# Patient Record
Sex: MALE | Race: WHITE | NOT HISPANIC OR LATINO | URBAN - METROPOLITAN AREA
[De-identification: names, ages, dates, MRNs, and addresses within clinical notes are randomized per-mention and may not be internally consistent; named-entity substitution may affect disease eponyms.]

---

## 2022-10-06 ENCOUNTER — EMERGENCY (EMERGENCY)
Facility: HOSPITAL | Age: 47
LOS: 0 days | Discharge: HOME | End: 2022-10-06
Attending: EMERGENCY MEDICINE | Admitting: EMERGENCY MEDICINE

## 2022-10-06 VITALS
HEART RATE: 67 BPM | SYSTOLIC BLOOD PRESSURE: 119 MMHG | OXYGEN SATURATION: 99 % | RESPIRATION RATE: 14 BRPM | DIASTOLIC BLOOD PRESSURE: 79 MMHG

## 2022-10-06 VITALS
HEART RATE: 78 BPM | WEIGHT: 199.96 LBS | SYSTOLIC BLOOD PRESSURE: 128 MMHG | OXYGEN SATURATION: 99 % | RESPIRATION RATE: 16 BRPM | DIASTOLIC BLOOD PRESSURE: 85 MMHG | TEMPERATURE: 97 F

## 2022-10-06 DIAGNOSIS — R11.2 NAUSEA WITH VOMITING, UNSPECIFIED: ICD-10-CM

## 2022-10-06 DIAGNOSIS — Z84.1 FAMILY HISTORY OF DISORDERS OF KIDNEY AND URETER: ICD-10-CM

## 2022-10-06 DIAGNOSIS — E78.5 HYPERLIPIDEMIA, UNSPECIFIED: ICD-10-CM

## 2022-10-06 DIAGNOSIS — R10.9 UNSPECIFIED ABDOMINAL PAIN: ICD-10-CM

## 2022-10-06 DIAGNOSIS — N20.0 CALCULUS OF KIDNEY: ICD-10-CM

## 2022-10-06 DIAGNOSIS — Z90.49 ACQUIRED ABSENCE OF OTHER SPECIFIED PARTS OF DIGESTIVE TRACT: ICD-10-CM

## 2022-10-06 LAB
ALBUMIN SERPL ELPH-MCNC: 5.2 G/DL — SIGNIFICANT CHANGE UP (ref 3.5–5.2)
ALP SERPL-CCNC: 68 U/L — SIGNIFICANT CHANGE UP (ref 30–115)
ALT FLD-CCNC: 70 U/L — HIGH (ref 0–41)
ANION GAP SERPL CALC-SCNC: 13 MMOL/L — SIGNIFICANT CHANGE UP (ref 7–14)
APPEARANCE UR: ABNORMAL
AST SERPL-CCNC: 38 U/L — SIGNIFICANT CHANGE UP (ref 0–41)
BACTERIA # UR AUTO: ABNORMAL
BASOPHILS # BLD AUTO: 0.02 K/UL — SIGNIFICANT CHANGE UP (ref 0–0.2)
BASOPHILS NFR BLD AUTO: 0.3 % — SIGNIFICANT CHANGE UP (ref 0–1)
BILIRUB DIRECT SERPL-MCNC: 0.2 MG/DL — SIGNIFICANT CHANGE UP (ref 0–0.3)
BILIRUB INDIRECT FLD-MCNC: 0.7 MG/DL — SIGNIFICANT CHANGE UP (ref 0.2–1.2)
BILIRUB SERPL-MCNC: 0.9 MG/DL — SIGNIFICANT CHANGE UP (ref 0.2–1.2)
BILIRUB UR-MCNC: NEGATIVE — SIGNIFICANT CHANGE UP
BUN SERPL-MCNC: 11 MG/DL — SIGNIFICANT CHANGE UP (ref 10–20)
CALCIUM SERPL-MCNC: 10.1 MG/DL — SIGNIFICANT CHANGE UP (ref 8.4–10.5)
CHLORIDE SERPL-SCNC: 99 MMOL/L — SIGNIFICANT CHANGE UP (ref 98–110)
CO2 SERPL-SCNC: 27 MMOL/L — SIGNIFICANT CHANGE UP (ref 17–32)
COLOR SPEC: YELLOW — SIGNIFICANT CHANGE UP
CREAT SERPL-MCNC: 1 MG/DL — SIGNIFICANT CHANGE UP (ref 0.7–1.5)
DIFF PNL FLD: ABNORMAL
EGFR: 95 ML/MIN/1.73M2 — SIGNIFICANT CHANGE UP
EOSINOPHIL # BLD AUTO: 0.12 K/UL — SIGNIFICANT CHANGE UP (ref 0–0.7)
EOSINOPHIL NFR BLD AUTO: 1.6 % — SIGNIFICANT CHANGE UP (ref 0–8)
EPI CELLS # UR: 0 /HPF — SIGNIFICANT CHANGE UP (ref 0–5)
GLUCOSE SERPL-MCNC: 97 MG/DL — SIGNIFICANT CHANGE UP (ref 70–99)
GLUCOSE UR QL: SIGNIFICANT CHANGE UP
HCT VFR BLD CALC: 47.2 % — SIGNIFICANT CHANGE UP (ref 42–52)
HGB BLD-MCNC: 16.7 G/DL — SIGNIFICANT CHANGE UP (ref 14–18)
HYALINE CASTS # UR AUTO: 7 /LPF — SIGNIFICANT CHANGE UP (ref 0–7)
IMM GRANULOCYTES NFR BLD AUTO: 0.4 % — HIGH (ref 0.1–0.3)
KETONES UR-MCNC: SIGNIFICANT CHANGE UP
LEUKOCYTE ESTERASE UR-ACNC: NEGATIVE — SIGNIFICANT CHANGE UP
LIDOCAIN IGE QN: 32 U/L — SIGNIFICANT CHANGE UP (ref 7–60)
LYMPHOCYTES # BLD AUTO: 2.99 K/UL — SIGNIFICANT CHANGE UP (ref 1.2–3.4)
LYMPHOCYTES # BLD AUTO: 39.7 % — SIGNIFICANT CHANGE UP (ref 20.5–51.1)
MCHC RBC-ENTMCNC: 34 PG — HIGH (ref 27–31)
MCHC RBC-ENTMCNC: 35.4 G/DL — SIGNIFICANT CHANGE UP (ref 32–37)
MCV RBC AUTO: 96.1 FL — HIGH (ref 80–94)
MONOCYTES # BLD AUTO: 0.5 K/UL — SIGNIFICANT CHANGE UP (ref 0.1–0.6)
MONOCYTES NFR BLD AUTO: 6.6 % — SIGNIFICANT CHANGE UP (ref 1.7–9.3)
NEUTROPHILS # BLD AUTO: 3.87 K/UL — SIGNIFICANT CHANGE UP (ref 1.4–6.5)
NEUTROPHILS NFR BLD AUTO: 51.4 % — SIGNIFICANT CHANGE UP (ref 42.2–75.2)
NITRITE UR-MCNC: NEGATIVE — SIGNIFICANT CHANGE UP
NRBC # BLD: 0 /100 WBCS — SIGNIFICANT CHANGE UP (ref 0–0)
PH UR: 5.5 — SIGNIFICANT CHANGE UP (ref 5–8)
PLATELET # BLD AUTO: 262 K/UL — SIGNIFICANT CHANGE UP (ref 130–400)
POTASSIUM SERPL-MCNC: 3.9 MMOL/L — SIGNIFICANT CHANGE UP (ref 3.5–5)
POTASSIUM SERPL-SCNC: 3.9 MMOL/L — SIGNIFICANT CHANGE UP (ref 3.5–5)
PROT SERPL-MCNC: 7.6 G/DL — SIGNIFICANT CHANGE UP (ref 6–8)
PROT UR-MCNC: ABNORMAL
RBC # BLD: 4.91 M/UL — SIGNIFICANT CHANGE UP (ref 4.7–6.1)
RBC # FLD: 11.7 % — SIGNIFICANT CHANGE UP (ref 11.5–14.5)
RBC CASTS # UR COMP ASSIST: 15 /HPF — HIGH (ref 0–4)
SODIUM SERPL-SCNC: 139 MMOL/L — SIGNIFICANT CHANGE UP (ref 135–146)
SP GR SPEC: 1.03 — HIGH (ref 1.01–1.03)
UROBILINOGEN FLD QL: ABNORMAL
WBC # BLD: 7.53 K/UL — SIGNIFICANT CHANGE UP (ref 4.8–10.8)
WBC # FLD AUTO: 7.53 K/UL — SIGNIFICANT CHANGE UP (ref 4.8–10.8)
WBC UR QL: 3 /HPF — SIGNIFICANT CHANGE UP (ref 0–5)

## 2022-10-06 PROCEDURE — 99285 EMERGENCY DEPT VISIT HI MDM: CPT

## 2022-10-06 PROCEDURE — 74177 CT ABD & PELVIS W/CONTRAST: CPT | Mod: 26,MA

## 2022-10-06 RX ORDER — SODIUM CHLORIDE 9 MG/ML
1000 INJECTION, SOLUTION INTRAVENOUS ONCE
Refills: 0 | Status: COMPLETED | OUTPATIENT
Start: 2022-10-06 | End: 2022-10-06

## 2022-10-06 RX ORDER — TAMSULOSIN HYDROCHLORIDE 0.4 MG/1
1 CAPSULE ORAL
Qty: 7 | Refills: 0
Start: 2022-10-06 | End: 2022-10-12

## 2022-10-06 RX ORDER — ONDANSETRON 8 MG/1
4 TABLET, FILM COATED ORAL ONCE
Refills: 0 | Status: DISCONTINUED | OUTPATIENT
Start: 2022-10-06 | End: 2022-10-06

## 2022-10-06 RX ADMIN — SODIUM CHLORIDE 1000 MILLILITER(S): 9 INJECTION, SOLUTION INTRAVENOUS at 12:55

## 2022-10-06 NOTE — ED PROVIDER NOTE - NSFOLLOWUPINSTRUCTIONS_ED_ALL_ED_FT
YOU WERE FOUND TO HAVE A RIGHT SIDED KIDNEY STONE. A COPY OF YOUR RESULTS IS ATTACHED.  FOLLOW UP WITH YOUR PRIMARY CARE DOCTOR AND A UROLOGIST.   TAKE IBUPROFEN AND TYLENOL AS NEEDED FOR PAIN.  TAKE FLOMAX ONCE A DAY (NIGHTLY) TO HELP YOU PASS THE STONE.     Kidney Stones    Kidney stones (urolithiasis) are deposits that form inside your kidneys. The intense pain is caused by the stone moving through the urinary tract. When the stone moves, the ureter goes into spasm around the stone. The stone is usually passed in the urine. Symptoms include abdominal, side, or back pain, nausea, vomiting, blood in the urine, frequency with urination. Drink enough water and fluids to keep your urine clear or pale yellow. This will help you to pass the stone or stone fragments.    SEEK IMMEDIATE MEDICAL CARE IF YOU HAVE THE FOLLOWING SYMPTOMS: pain not controlled with medication, fever/chills, worsening vomiting, inability to urinate, or dizziness/lightheadedness.

## 2022-10-06 NOTE — ED PROVIDER NOTE - PROGRESS NOTE DETAILS
Doug: CT results reviewed, pt updated with results, will dc  Patient to be discharged from ED. Any available test results were discussed with patient and/or family. Verbal instructions given, including instructions to return to ED immediately for any new, worsening, or concerning symptoms. Patient endorsed understanding. Written discharge instructions additionally given, including follow-up plan.

## 2022-10-06 NOTE — ED PROVIDER NOTE - CLINICAL SUMMARY MEDICAL DECISION MAKING FREE TEXT BOX
Labs okay.  Normal renal function.  No UTI.  CT with 3 x 3 x 4 mm mid ureteral stone on the right associated with hydronephrosis.  Pain is controlled in the ED.  No infectious symptoms.  All results were discussed with patient, he is comfortable discharge on Flomax and prompt urology follow-up.  Return precautions discussed.

## 2022-10-06 NOTE — ED PROVIDER NOTE - OBJECTIVE STATEMENT
46M PMHX HLD, s/p appendectomy presents for R flank pain x 2d, located primarily in R flank, radiates to RLQ, associated with nausea and nonbloody vomiting. Reports dark urine a few days ago. Denies gross hematuria/dysuria/other urinary sxs. No fever/chills, No cp/sob, diarrhea, constipation. Denies hx of kidney stones but endorses family hx of kidney stones.

## 2022-10-06 NOTE — ED PROVIDER NOTE - PHYSICAL EXAMINATION
VITAL SIGNS: I have reviewed nursing notes and confirm.  CONSTITUTIONAL: well-appearing male, NAD, ambulated to exam room.  SKIN: Warm dry, normal skin turgor  HEAD: NCAT  EYES: EOMI, no scleral icterus  ENT: Moist mucous membranes, normal pharynx with no erythema or exudates  NECK: Supple; non tender. Full ROM. No cervical LAD  CARD: RRR, no murmurs, rubs or gallops  RESP: clear to ausculation b/l.  No rales, rhonchi, or wheezing.  ABD: soft, non-tender, non-distended, no rebound or guarding. + R CVA tenderness  EXT: Full ROM, no bony tenderness, no pedal edema, no calf tenderness  NEURO: Normal gait.  PSYCH: Cooperative, appropriate.

## 2022-10-06 NOTE — ED ADULT NURSE NOTE - OBJECTIVE STATEMENT
Pt reports right sided flank pain that radiates to the right side of the abd with nausea. Denies fever, diarrhea, chest pain, or sob. Labs drawn, IV established, urine collected, medication administered.

## 2022-10-06 NOTE — ED PROVIDER NOTE - PATIENT PORTAL LINK FT
You can access the FollowMyHealth Patient Portal offered by [facility] by registering at the following website: [email or web address]. By joining [employer]’s FollowMyHealth portal, you will also be able to view your health information using other applications (apps) compatible with our system.

## 2022-10-06 NOTE — ED PROVIDER NOTE - CARE PROVIDER_API CALL
Rocco Christina)  Urology  [address] [place] [address], [address]  [place] [address]  Phone: [phone]  Fax: [phone]  Follow Up Time: 1-3 Days

## 2022-10-06 NOTE — ED PROVIDER NOTE - ATTENDING CONTRIBUTION TO CARE
46-year-old man, no significant past medical history complains of right flank pain intermittent over the last couple of days, associated with nausea and vomiting.  He did have a couple of episodes of dark urine, no dysuria, fever, chills.  On exam he is afebrile, uncomfortable but nontoxic-appearing.  Right CVA is tender, no rash.  No radha abdominal tenderness.  Symptoms suggestive of renal colic.  Will check labs, urine, imaging, reassess.

## 2024-04-09 ENCOUNTER — EMERGENCY (EMERGENCY)
Facility: HOSPITAL | Age: 49
LOS: 0 days | Discharge: HOME | End: 2024-04-09
Attending: EMERGENCY MEDICINE
Payer: COMMERCIAL

## 2024-04-09 VITALS
OXYGEN SATURATION: 98 % | HEART RATE: 81 BPM | HEIGHT: 72 IN | SYSTOLIC BLOOD PRESSURE: 130 MMHG | TEMPERATURE: 98 F | WEIGHT: 178.57 LBS | DIASTOLIC BLOOD PRESSURE: 93 MMHG | RESPIRATION RATE: 18 BRPM

## 2024-04-09 DIAGNOSIS — R19.7 DIARRHEA, UNSPECIFIED: ICD-10-CM

## 2024-04-09 DIAGNOSIS — K92.1 MELENA: ICD-10-CM

## 2024-04-09 DIAGNOSIS — R10.13 EPIGASTRIC PAIN: ICD-10-CM

## 2024-04-09 DIAGNOSIS — Z96.641 PRESENCE OF RIGHT ARTIFICIAL HIP JOINT: ICD-10-CM

## 2024-04-09 DIAGNOSIS — Z87.19 PERSONAL HISTORY OF OTHER DISEASES OF THE DIGESTIVE SYSTEM: ICD-10-CM

## 2024-04-09 DIAGNOSIS — R11.2 NAUSEA WITH VOMITING, UNSPECIFIED: ICD-10-CM

## 2024-04-09 DIAGNOSIS — Z90.49 ACQUIRED ABSENCE OF OTHER SPECIFIED PARTS OF DIGESTIVE TRACT: ICD-10-CM

## 2024-04-09 PROBLEM — Z78.9 OTHER SPECIFIED HEALTH STATUS: Chronic | Status: ACTIVE | Noted: 2022-10-06

## 2024-04-09 LAB
ALBUMIN SERPL ELPH-MCNC: 4.9 G/DL — SIGNIFICANT CHANGE UP (ref 3.5–5.2)
ALP SERPL-CCNC: 68 U/L — SIGNIFICANT CHANGE UP (ref 30–115)
ALT FLD-CCNC: 54 U/L — HIGH (ref 0–41)
ANION GAP SERPL CALC-SCNC: 15 MMOL/L — HIGH (ref 7–14)
APTT BLD: 32.4 SEC — SIGNIFICANT CHANGE UP (ref 27–39.2)
AST SERPL-CCNC: 36 U/L — SIGNIFICANT CHANGE UP (ref 0–41)
BASOPHILS # BLD AUTO: 0 K/UL — SIGNIFICANT CHANGE UP (ref 0–0.2)
BASOPHILS NFR BLD AUTO: 0 % — SIGNIFICANT CHANGE UP (ref 0–1)
BILIRUB SERPL-MCNC: 0.7 MG/DL — SIGNIFICANT CHANGE UP (ref 0.2–1.2)
BUN SERPL-MCNC: 9 MG/DL — LOW (ref 10–20)
CALCIUM SERPL-MCNC: 10 MG/DL — SIGNIFICANT CHANGE UP (ref 8.4–10.5)
CHLORIDE SERPL-SCNC: 105 MMOL/L — SIGNIFICANT CHANGE UP (ref 98–110)
CO2 SERPL-SCNC: 25 MMOL/L — SIGNIFICANT CHANGE UP (ref 17–32)
CREAT SERPL-MCNC: 0.9 MG/DL — SIGNIFICANT CHANGE UP (ref 0.7–1.5)
EGFR: 106 ML/MIN/1.73M2 — SIGNIFICANT CHANGE UP
EOSINOPHIL # BLD AUTO: 0.04 K/UL — SIGNIFICANT CHANGE UP (ref 0–0.7)
EOSINOPHIL NFR BLD AUTO: 0.9 % — SIGNIFICANT CHANGE UP (ref 0–8)
GLUCOSE SERPL-MCNC: 94 MG/DL — SIGNIFICANT CHANGE UP (ref 70–99)
HCT VFR BLD CALC: 44 % — SIGNIFICANT CHANGE UP (ref 42–52)
HGB BLD-MCNC: 15.6 G/DL — SIGNIFICANT CHANGE UP (ref 14–18)
INR BLD: 1.14 RATIO — SIGNIFICANT CHANGE UP (ref 0.65–1.3)
LYMPHOCYTES # BLD AUTO: 1.73 K/UL — SIGNIFICANT CHANGE UP (ref 1.2–3.4)
LYMPHOCYTES # BLD AUTO: 37.4 % — SIGNIFICANT CHANGE UP (ref 20.5–51.1)
MCHC RBC-ENTMCNC: 32.9 PG — HIGH (ref 27–31)
MCHC RBC-ENTMCNC: 35.5 G/DL — SIGNIFICANT CHANGE UP (ref 32–37)
MCV RBC AUTO: 92.8 FL — SIGNIFICANT CHANGE UP (ref 80–94)
MONOCYTES # BLD AUTO: 0.4 K/UL — SIGNIFICANT CHANGE UP (ref 0.1–0.6)
MONOCYTES NFR BLD AUTO: 8.7 % — SIGNIFICANT CHANGE UP (ref 1.7–9.3)
NEUTROPHILS # BLD AUTO: 1.77 K/UL — SIGNIFICANT CHANGE UP (ref 1.4–6.5)
NEUTROPHILS NFR BLD AUTO: 38.2 % — LOW (ref 42.2–75.2)
PLATELET # BLD AUTO: 208 K/UL — SIGNIFICANT CHANGE UP (ref 130–400)
PMV BLD: 9.5 FL — SIGNIFICANT CHANGE UP (ref 7.4–10.4)
POTASSIUM SERPL-MCNC: 4.5 MMOL/L — SIGNIFICANT CHANGE UP (ref 3.5–5)
POTASSIUM SERPL-SCNC: 4.5 MMOL/L — SIGNIFICANT CHANGE UP (ref 3.5–5)
PROT SERPL-MCNC: 7.6 G/DL — SIGNIFICANT CHANGE UP (ref 6–8)
PROTHROM AB SERPL-ACNC: 13 SEC — HIGH (ref 9.95–12.87)
RBC # BLD: 4.74 M/UL — SIGNIFICANT CHANGE UP (ref 4.7–6.1)
RBC # FLD: 11.5 % — SIGNIFICANT CHANGE UP (ref 11.5–14.5)
SODIUM SERPL-SCNC: 145 MMOL/L — SIGNIFICANT CHANGE UP (ref 135–146)
WBC # BLD: 4.63 K/UL — LOW (ref 4.8–10.8)
WBC # FLD AUTO: 4.63 K/UL — LOW (ref 4.8–10.8)

## 2024-04-09 PROCEDURE — 99285 EMERGENCY DEPT VISIT HI MDM: CPT

## 2024-04-09 PROCEDURE — 85610 PROTHROMBIN TIME: CPT

## 2024-04-09 PROCEDURE — 85025 COMPLETE CBC W/AUTO DIFF WBC: CPT

## 2024-04-09 PROCEDURE — 86850 RBC ANTIBODY SCREEN: CPT

## 2024-04-09 PROCEDURE — 86901 BLOOD TYPING SEROLOGIC RH(D): CPT

## 2024-04-09 PROCEDURE — 74177 CT ABD & PELVIS W/CONTRAST: CPT | Mod: MC

## 2024-04-09 PROCEDURE — 96374 THER/PROPH/DIAG INJ IV PUSH: CPT | Mod: XU

## 2024-04-09 PROCEDURE — 80053 COMPREHEN METABOLIC PANEL: CPT

## 2024-04-09 PROCEDURE — 36415 COLL VENOUS BLD VENIPUNCTURE: CPT

## 2024-04-09 PROCEDURE — 86900 BLOOD TYPING SEROLOGIC ABO: CPT

## 2024-04-09 PROCEDURE — 85730 THROMBOPLASTIN TIME PARTIAL: CPT

## 2024-04-09 PROCEDURE — C9113: CPT

## 2024-04-09 PROCEDURE — 74177 CT ABD & PELVIS W/CONTRAST: CPT | Mod: 26,MC

## 2024-04-09 PROCEDURE — 96375 TX/PRO/DX INJ NEW DRUG ADDON: CPT

## 2024-04-09 PROCEDURE — 99284 EMERGENCY DEPT VISIT MOD MDM: CPT | Mod: 25

## 2024-04-09 RX ORDER — SODIUM CHLORIDE 9 MG/ML
1000 INJECTION INTRAMUSCULAR; INTRAVENOUS; SUBCUTANEOUS ONCE
Refills: 0 | Status: COMPLETED | OUTPATIENT
Start: 2024-04-09 | End: 2024-04-09

## 2024-04-09 RX ORDER — PANTOPRAZOLE SODIUM 20 MG/1
40 TABLET, DELAYED RELEASE ORAL ONCE
Refills: 0 | Status: COMPLETED | OUTPATIENT
Start: 2024-04-09 | End: 2024-04-09

## 2024-04-09 RX ORDER — FAMOTIDINE 10 MG/ML
20 INJECTION INTRAVENOUS ONCE
Refills: 0 | Status: DISCONTINUED | OUTPATIENT
Start: 2024-04-09 | End: 2024-04-09

## 2024-04-09 RX ORDER — ONDANSETRON 8 MG/1
4 TABLET, FILM COATED ORAL ONCE
Refills: 0 | Status: COMPLETED | OUTPATIENT
Start: 2024-04-09 | End: 2024-04-09

## 2024-04-09 RX ADMIN — SODIUM CHLORIDE 1000 MILLILITER(S): 9 INJECTION INTRAMUSCULAR; INTRAVENOUS; SUBCUTANEOUS at 13:04

## 2024-04-09 RX ADMIN — PANTOPRAZOLE SODIUM 40 MILLIGRAM(S): 20 TABLET, DELAYED RELEASE ORAL at 13:04

## 2024-04-09 RX ADMIN — ONDANSETRON 4 MILLIGRAM(S): 8 TABLET, FILM COATED ORAL at 13:03

## 2024-04-09 NOTE — ED ADULT NURSE NOTE - OBJECTIVE STATEMENT
Pt with C/O N/V/ blood in he stool for 5 days , pt denies fever chills ,denies chest pain denies SOB at thi time .

## 2024-04-09 NOTE — ED PROVIDER NOTE - CLINICAL SUMMARY MEDICAL DECISION MAKING FREE TEXT BOX
48-year-old male past medical history of gastritis presents to the emergency department for nausea vomiting and bloody stool that started today.  Patient had URI symptoms for the past week and was started on flu medication.  Patient has history of hemorrhoids and sore blood in the toilet when he went to the bathroom.  Last colonoscopy was 6 months ago and patient reports was normal.  Patient no fever or signs of infectious etiology.  Patient is otherwise eating and drinking normally and denies any chest pain or shortness of breath.    On exam, vital signs reviewed.  Patient well-appearing.  Patient has normal heart and lung exam.  Abdomen is very soft with no epigastric tenderness.  No guarding or rebound.  No CVA tenderness.  Rectal exam shows small amount of blood with no active noticeable hemorrhage.  IV was placed and labs sent, CT scan of abdomen pelvis performed.  Patient has normal hemoglobin and CT scan is unremarkable.  Patient was spoken to in detail about results and plan of care.  Will discharge with close GI follow-up and patient to monitor symptoms and for blood loss.  Strict return precautions discussed and patient aware he may require immediate return to the ED for any significant increase in bleeding, dizziness, weakness, abdominal pain.  Patient aware he may require repeat colonoscopy/endoscopy.  All questions and concerns addressed and copy of results given to patient.

## 2024-04-09 NOTE — ED PROVIDER NOTE - PATIENT PORTAL LINK FT
You can access the FollowMyHealth Patient Portal offered by [facility] by registering at the following website: [email or web address]. By joining [employer]’s FollowMyHealth portal, you will also be able to view your health information using other applications (apps) compatible with our system.

## 2024-04-09 NOTE — ED ADULT TRIAGE NOTE - NS ED NURSE DIRECT TO ROOM YN
HEMODIAYLYSIS    01/21/23  Patient requires Cellentia Dialyzer due to \"passing out\" with Revaclear dialyzer.         No

## 2024-04-09 NOTE — ED ADULT NURSE NOTE - NSFALLUNIVINTERV_ED_ALL_ED
Warm pack applied to back   Bed/Stretcher in lowest position, wheels locked, appropriate side rails in place/Call bell, personal items and telephone in reach/Instruct patient to call for assistance before getting out of bed/chair/stretcher/Non-slip footwear applied when patient is off stretcher/[place] to call system/Physically safe environment - no spills, clutter or unnecessary equipment/Purposeful proactive rounding/Room/bathroom lighting operational, light cord in reach

## 2024-04-09 NOTE — ED PROVIDER NOTE - CONSIDERATION OF ADMISSION OBSERVATION
I considered admission for this patient. They improved in the Emergency Department and shared decision making utilized. Will attempt outpatient management. Patient is a good candidate to attempt outpatient management. Supportive care and home care discussed in detail. Patient aware they may have to return for re-evaluation and possible admission if outpatient treatment fails. Strict return precautions discussed. Consideration of Admission/Observation

## 2024-04-09 NOTE — ED PROVIDER NOTE - OBJECTIVE STATEMENT
49 y/o M, pmhx of gastritis, internal hemorrhoids, s/p appendectomy and right hip replacement surgery a few years ago, comes in for nausea and NBNB Vomiting x4 days and bloody stool that started today. Report URI symptoms for 1 week, has been taking medications after going to urgent care. Had episodes of n/v and diarrhea. Also endorses epigastric burning sensation, has not taken omeprazole for a few weeks. Today patient reports blood with stool in toilet bowl, has happened before with his hemorrhoids. Last colonoscopy 6 months ago with Dr. Clements [facility], was normal. Denies fevers, chest pain, sob, abd pain, dysuria, hematuria.

## 2024-04-09 NOTE — ED PROVIDER NOTE - NSFOLLOWUPINSTRUCTIONS_ED_ALL_ED_FT
Please follow up with your primary care physician and gastroenterologist. Return to the emergency department if your symptoms do not resolve and/or worsen. If you've been prescribed medications, please take your medications as prescribed.  ------------------------------------------------------------------------------------------------------------------------  Nausea / Vomiting    Nausea is the feeling that you have to vomit. As nausea gets worse, it can lead to vomiting. Vomiting puts you at an increased risk for dehydration. Older adults and people with other diseases or a weak immune system are at higher risk for dehydration. Drink clear fluids in small but frequent amounts as tolerated. Eat bland, easy-to-digest foods in small amounts as tolerated.    SEEK IMMEDIATE MEDICAL CARE IF YOU HAVE ANY OF THE FOLLOWING SYMPTOMS: fever, inability to keep sufficient fluids down, black or bloody vomitus, black or bloody stools, lightheadedness/dizziness, chest pain, severe headache, rash, shortness of breath, cold or clammy skin, confusion, pain with urination, or any signs of dehydration.  ------------------------------------------------------------------------------------------------------------------------  Rectal Bleeding      Rectal bleeding is when blood comes out of the opening of the butt (anus). People with this kind of bleeding may notice bright red blood in their underwear or in the toilet after they poop (have a bowel movement). They may also have dark red or black poop (stool). Rectal bleeding is often a sign that something is wrong. It needs to be checked by a doctor.      Follow these instructions at home:    Watch for any changes in your condition. Take these actions to help with bleeding and discomfort:  Eat a diet that is high in fiber. This will keep your poop soft so it is easier for you to poop without pushing too hard. Ask your doctor to tell you what foods and drinks are high in fiber.  Drink enough fluid to keep your pee (urine) clear or pale yellow. This also helps keep your poop soft.  Try taking a warm bath. This may help with pain.  Keep all follow-up visits as told by your doctor. This is important.      Get help right away if:    You have new bleeding.  You have more bleeding than before.  You have black or dark red poop.  You throw up (vomit) blood or something that looks like coffee grounds.  You have pain or tenderness in your belly (abdomen).  You have a fever.  You feel weak.  You feel sick to your stomach (nauseous).  You pass out (faint).  You have very bad pain in your butt.  You cannot poop.  This information is not intended to replace advice given to you by your health care provider. Make sure you discuss any questions you have with your health care provider.

## 2024-04-09 NOTE — ED PROVIDER NOTE - PHYSICAL EXAMINATION
GENERAL: Well-developed; well-nourished; in no acute distress.  SKIN: warm, well perfused  HEAD: Normocephalic; atraumatic.  EYES: no conjunctival erythema, ocular motions intact and appropriate  ENT: No nasal discharge; airway clear.  CARD: Regular rate and rhythm.   RESP: LCTAB; No wheezes or crackles  ABD: soft, nontender, and nondistended  Upper EXT: Normal ROM. Rad pulses 2+ symm, cap refill <2 secs, sensation intact and symm,  strength 5/5  Lower EXT: strength 5/5, ambulates well independently  GI: rectal exam performed with PCA in room, BRBPR observed. no palpable masses internally. no anal fissures visualized

## 2024-04-09 NOTE — ED PROVIDER NOTE - DIFFERENTIAL DIAGNOSIS
Differential Diagnosis The differential diagnosis for patients clinical presentation includes but is not limited to:  UGIB, LGIB, hemorrhoids, polyps, diverticular disease, colitis, pancreatitis, UTI, infection, Diverticulitis, SBO, biliary colic, cholecystitis, pyelonephritis, aortic dissection

## 2024-04-09 NOTE — ED PROVIDER NOTE - ATTENDING CONTRIBUTION TO CARE
ED PROVIDER NOTE    Triage and nursing notes were reviewed and considered, including chief complaint, presenting vital signs, past medical history, medications history, allergies and social hx.  There may be discrepancies between the nursing note and the history/information that I obtained from the patient/family.    JIMMIE Vargas is a 23 year old male who presents with evaluation with concerns for low back pain and abdominal pain.  The patient states his symptoms began this evening.  He describes the pain as cramping in nature.  He denies fever or chills.  He endorses nausea and vomiting.  The patient denies radiation of his pain.  He states that nothing makes the pain better or worse.  He denies fever or chills.  He denies bright red blood per rectum or black tarry stools.  He denies diarrhea.      PAST MEDICAL HISTORY  Past Medical History:   Diagnosis Date   • Ankylosing spondylitis (CMS/HCC)        FAMILY HISTORY  No family history on file.    SOCIAL HISTORY  Social History     Tobacco Use   • Smoking status: Passive Smoke Exposure - Never Smoker   • Smokeless tobacco: Never Used   Substance Use Topics   • Alcohol use: No   • Drug use: No       SURGICAL HISTORY  No past surgical history on file.    CURRENT MEDICATIONS  Discharge Medication List as of 6/14/2020  2:09 AM      CONTINUE these medications which have NOT CHANGED    Details   lamotrigine (LAMICTAL) 200 MG tablet Take 200 mg by mouth daily.Historical Med      Adalimumab (HUMIRA SC) Historical Med      dicyclomine (BENTYL) 20 MG tablet Take 1 tablet by mouth 4 times daily (before meals and nightly).Normal, Disp-20 tablet, R-0      famotidine (PEPCID) 20 MG tablet Take 1 tablet by mouth 2 times daily.Normal, Disp-20 tablet, R-0      sucralfate (CARAFATE) 1 g tablet Take 1 tablet by mouth 4 times daily.Normal, Disp-20 tablet, R-0      FLUoxetine (PROZAC) 20 MG capsule Take 1 capsule by mouth daily.Eprescribe, Disp-30 capsule, R-3      predniSONE  (DELTASONE) 20 MG tablet Prednisone 20 mg tablets one tablet by mouth 3 times a day for 5 daysEprescribe, Disp-15 tablet, R-0             ALLERGIES:  No Known Allergies    REVIEW OF SYSTEMS  Full 10 point review of systems performed and are otherwise negative unless listed in HPI.    PHYSICAL EXAM  Vitals:    06/14/20 0056 06/14/20 0130 06/14/20 0145 06/14/20 0200   BP: 132/70 128/73 122/69 120/69   Pulse: 72 63 64 65   Resp: 18   16   Temp: 98 °F (36.7 °C)      SpO2: 99% 97% 98% 99%   Weight: 80.7 kg      Height: 5' 8\" (1.727 m)        GENERAL: Patient is alert, non-toxic and well-appearing, well-developed, well-nourished, in no acute distress.    HEENT:  Normocephalic, atraumatic.    NECK:  Supple, no lymphadenopathy.  No restricted ROM.  CARDIOVASCULAR:  Regular rate and rhythm with no murmurs, rubs or gallops. There are 2+ radial and dorsalis pedis pulses present bilaterally.  PULMONARY:  The lungs are clear to auscultation bilaterally without any wheezes, rales, or rhonchi.   There is normal rate and respiratory effort.  There are no retractions or abnormal respiratory muscle usage.  ABDOMEN:  +BS x 4, soft, nontender and nondistended without any appreciable masses. No aortic bruit.  No hepatosplenomegaly. No peritoneal findings.  :  No CVA or suprapubic tenderness.    MUSCULOSKELETAL:  There is normal range of motion of all four extremities.  No cyanosis or edema.  There is no bilateral calf swelling, erythema, warmth or tenderness that would be suggestive of venous thromboembolism.    SKIN:  Pink, warm and dry, without diaphoresis. There are no rashes or open lesions on the partially exposed skin.  NEUROLOGIC:  The patient is alert.  Orientation, speech and thought content are appropriate. There are no obvious strength or sensory deficits.  PSYCH: Pleasant and cooperative with normal affect.  The patient is lucid and has the capacity to make independent decisions.      DIAGNOSTICS  Labs Reviewed    URINALYSIS MACROSCOPIC C&SII - Abnormal; Notable for the following components:       Result Value    KETONES, URINALYSIS 20  (*)     All other components within normal limits   CBC WITH AUTOMATED DIFFERENTIAL (PERFORMABLE ONLY) - Abnormal; Notable for the following components:    HGB 17.2 (*)     RDW-SD 38.2 (*)     All other components within normal limits    Narrative:     This is an appended report.  These results have been appended to a previously verified report.   LACTIC ACID, VENOUS - Normal   LIPASE - Normal   RAINBOW DRAW    Narrative:     The following orders were created for panel order [place] Draw Light Blue Top Collected? 1 Label; Red Top Collected? No Labels; Light Green Top Collected? 1 Label; Lavender Top Collected? 1 Label; Gold Top Collected? 1 Label; Pink Top Collected? No Labels; Grey Top Collected? 1 Label.  Procedure                               Abnormality         Status                     ---------                               -----------         ------                     Light Blue Top[[id number]]                                 Final result               Light Green Top[[id number]]                                Final result               Lavender Top[[id number]]                                   Final result               Gold Top[[id number]]                                       Final result               Grey Top Tube[[id number]]                                  Final result                 Please view results for these tests on the individual orders.   URINALYSIS & REFLEX MICRO WITH CULTURE IF INDICATED    Narrative:     The following orders were created for panel order URINALYSIS & REFLEX MICRO WITH CULTURE IF INDICATED.  Procedure                               Abnormality         Status                     ---------                               -----------         ------                     URINALYSIS & REFLEX MI...[[id number]]  Abnormal            Final result                  Please view results for these tests on the individual orders.   LIGHT BLUE TOP   LIGHT GREEN TOP   LAVENDER TOP   GOLD TOP   GREY TOP TUBE   CBC WITH DIFFERENTIAL    Narrative:     The following orders were created for panel order CBC with Automated Differential.  Procedure                               Abnormality         Status                     ---------                               -----------         ------                     CBC with Automated Dif...[[id number]]  Abnormal            Final result                 Please view results for these tests on the individual orders.   COMPREHENSIVE METABOLIC PANEL     None      ED COURSE/MEDICAL DECISION MAKING  Pertinent Labs & Imaging studies reviewed--see above for abnormal labs and rad interpretations.    Patient seen and examined.  Vital signs reviewed and were unremarkable and reassuring.  Patient had labs obtained and these were unremarkable and reassuring.  The patient's symptoms were relieved with Zofran.  Patient is discharged home in stable condition advised to follow-up with his primary care provider.  He knows to return for new or worsening symptoms or if he has any concerns.    At this point I feel the patient is appropriate for outpatient therapy. I discussed with them there is no emergent need for inpatient evaluation, surgery, or further interventional therapy that is indicated at this time, but that there is a risk of deterioration and progressive pathology that warrants prompt follow-up and repeat evaluation. ED return precautions were carefully reviewed, as well as the expected course and natural history of disease, and signs and symptoms of worsening illness. The patient is comfortable with outpatient care, and expresses understanding of the care plan and priorities, as well as F/U and ED return instructions.    DIAGNOSIS  Problem List Items Addressed This Visit     None      Visit Diagnoses     Abdominal pain, unspecified abdominal  location    -  Primary          DISPOSITION  Home in stable and improved condition  Discharge Medication List as of 6/14/2020  2:09 AM      START taking these medications    Details   ondansetron (ZOFRAN ODT) 4 MG disintegrating tablet Place 1 tablet onto the tongue every 8 hours as needed for Nausea.Eprescribe, Disp-10 tablet, R-0             Follow up:  No follow-up provider specified.       Patricio Bob,   06/27/20 3675     I personally evaluated patient. I agree with the findings and plan with all documentation on chart except as documented  in my note.    48-year-old male past medical history of gastritis presents to the emergency department for nausea vomiting and bloody stool that started today.  Patient had URI symptoms for the past week and was started on flu medication.  Patient has history of hemorrhoids and sore blood in the toilet when he went to the bathroom.  Last colonoscopy was 6 months ago and patient reports was normal.  Patient no fever or signs of infectious etiology.  Patient is otherwise eating and drinking normally and denies any chest pain or shortness of breath.    On exam, vital signs reviewed.  Patient well-appearing.  Patient has normal heart and lung exam.  Abdomen is very soft with no epigastric tenderness.  No guarding or rebound.  No CVA tenderness.  Rectal exam shows small amount of blood with no active noticeable hemorrhage.  IV was placed and labs sent, CT scan of abdomen pelvis performed.  Patient has normal hemoglobin and CT scan is unremarkable.  Patient was spoken to in detail about results and plan of care.  Will discharge with close GI follow-up and patient to monitor symptoms and for blood loss.  Strict return precautions discussed and patient aware he may require immediate return to the ED for any significant increase in bleeding, dizziness, weakness, abdominal pain.  Patient aware he may require repeat colonoscopy/endoscopy.  All questions and concerns addressed and copy of results given to patient.

## 2024-04-09 NOTE — ED ADULT TRIAGE NOTE - NS ED NURSE BANDS TYPE
"Maciej Peña Romario (82 y.o. Male)     Date of Birth Social Security Number Address Home Phone MRN    1936  [address]  [facility] [address] [phone] [id number]    [employer] Marital Status          [place]        Admission Date Admission Type Admitting Provider Attending Provider Department, Room/Bed    11/20/18 Emergency Jhony Kirby MD Bleibel, Hani A, MD [address], 377/1    Discharge Date Discharge Disposition Discharge Destination         [place] [facility] [place]              Attending Provider:  Jhony Kirby MD    Allergies:  No Known Allergies    Isolation:  None   Infection:  None   Code Status:  CPR    Ht:  175.3 cm (69\")   Wt:  79.5 kg (175 lb 4.8 oz)    Admission Cmt:  None   Principal Problem:  Closed displaced intertrochanteric fracture of left femur (CMS/[facility]) [S72.142A]                 Active Insurance as of 11/20/2018     Primary Coverage     Payor Plan Insurance Group Employer/Plan Group    MEDICARE MEDICARE A & B      Payor Plan Address Payor Plan Phone Number Payor Plan Fax Number Effective Dates    [address] [phone]  3/1/2002 - None Entered    [facility] [id number]       Subscriber Name Subscriber Birth Date Member ID       MACIEJ PEÑA ROMARIO 1936 [id number]           Secondary Coverage     Payor Plan Insurance Group Employer/Plan Group    [facility]     Payor Plan Address Payor Plan Phone Number Payor Plan Fax Number Effective Dates    [address] [address]   1/1/2018 - None Entered    [facility] [address]       Subscriber Name Subscriber Birth Date Member ID       MARIANA,MACIEJ ROMARIO 1936 [id number]                 Emergency Contacts      (Rel.) Home Phone Work Phone Mobile Phone    Berhane Luong (Son) [phone] -- [phone]        [address] [address]  Phone:  [phone]  Fax:   Date: Nov 24, 2018      Referral to Home Health     Patient:  " Maciej Funk MRN:  [id number]   [address]   [place] [address] :  1936  SSN:    Phone: [phone] Sex:  M      INSURANCE PAYOR PLAN GROUP # SUBSCRIBER ID   Primary:  Secondary:    MEDICARE  [facility] [id number]  [id number]    [facility] [id number]  [id number]      Referring Provider Information:  KAREN SWARTZ Phone: [phone] Fax:       Referral Information:   # Visits:  1 Referral Type: Home Health [42]   Urgency:  Routine Referral Reason: Specialty Services Required   Start Date: 2018 End Date:  To be determined by Insurer   Diagnosis: Closed displaced intertrochanteric fracture of left femur, initial encounter (CMS/[facility]) (S72.142A [ICD-10-CM] 820.21 [ICD-9-CM])  Mass of right lung (R91.8 [ICD-10-CM] 786.6 [ICD-9-CM])  Closed displaced subtrochanteric fracture of left femur, initial encounter (CMS/[facility]) (S72.22XA [ICD-10-CM] 820.22 [ICD-9-CM])  Syncope, unspecified syncope type (R55 [ICD-10-CM] 780.2 [ICD-9-CM])  Impaired functional mobility and activity tolerance (Z74.09 [ICD-10-CM] V49.89 [ICD-9-CM])  Impaired mobility and activities of daily living (Z74.09 [ICD-10-CM] 799.89 [ICD-9-CM])  Atherosclerosis (I70.90 [ICD-10-CM] 440.9 [ICD-9-CM])  History of adenomatous polyp of colon (Z86.010 [ICD-10-CM] V12.72 [ICD-9-CM])  Mixed hyperlipidemia (E78.2 [ICD-10-CM] 272.2 [ICD-9-CM])  Tobacco dependence syndrome (F17.200 [ICD-10-CM] 305.1 [ICD-9-CM])  Nicotine dependence, cigarettes, with other nicotine-induced disorders (F17.218 [ICD-10-CM] 292.89 [ICD-9-CM])  Carotid stenosis, bilateral (I65.23 [ICD-10-CM] 433.10,433.30 [ICD-9-CM])  Optic atrophy (H47.20 [ICD-10-CM] 377.10 [ICD-9-CM])  Follow-up surgery care (Z09 [ICD-10-CM] V67.00 [ICD-9-CM])  Pulmonary hypertension (CMS/HCC) (I27.20 [ICD-10-CM] 416.8 [ICD-9-CM])      Refer to Dept: [facility]  Refer to Provider:   Refer to Facility:       Face to Face Visit Date: 2018  Follow-up Provider for Plan of Care? I treated the  patient in an acute care facility and will not continue treatment after discharge.  Follow-up Provider: BLAIR BOUDREAUX [[id number]]  Reason/Clinical Findings: post op/ left femur  Describe mobility limitations that make leaving home difficult: post op/ left femur  Nursing/Therapeutic Services Requested: Skilled Nursing  Nursing/Therapeutic Services Requested: Physical Therapy  Nursing/Therapeutic Services Requested: Occupational Therapy  Skilled nursing orders: Pain management  Skilled nursing orders: Wound care dressing/changes  Skilled nursing orders: Cardiopulmonary assessments  Skilled nursing orders: Neurovascular assessments  PT orders: Total joint pathway  PT orders: Therapeutic exercise  PT orders: Gait Training  PT orders: Transfer training  PT orders: Strengthening  PT orders: Home safety assessment  Weight Bearing Status: As Tolerated  Occupational orders: Activities of daily living  Occupational orders: Energy conservation  Occupational orders: Strengthening  Occupational orders: Cognition  Occupational orders: Fine motor  Occupational orders: Home safety assessment  Frequency: 1 Week 1     This document serves as a request of services and does not constitute Insurance authorization or approval of services.  To determine eligibility, please contact the members Insurance carrier to verify and review coverage.     If you have medical questions regarding this request for services. Please contact [address] at [phone] between the hours of 8:00am - 5:00pm (Mon-Fri).       Authorizing Provider:Kathy Cam APRN  Authorizing Provider's NPI: [id number]  Order Entered By: Kathy Cam APRN 11/24/2018  9:44 AM     Electronically signed by: Kathy Cam APRN 11/24/2018  9:44 AM               History & Physical      Wu Hare MD at 11/21/2018  1:14 PM          H&P reviewed. The patient was examined and there are no changes to the H&P.      Temp:  [96.9 °F (36.1  Name band; °C)-99.8 °F (37.7 °C)] 97.7 °F (36.5 °C)  Heart Rate:  [66-97] 90  Resp:  [18-20] 18  BP: (123-160)/(58-90) 149/69    No Known Allergies    Prior to Admission medications    Medication Sig Start Date End Date Taking? Authorizing Provider   aspirin 81 MG chewable tablet Chew 81 mg Daily.    Jt Ley MD   latanoprost (XALATAN) 0.005 % ophthalmic solution Administer 1 drop into the left eye Every Night.    Jt Ley MD   lisinopril (PRINIVIL,ZESTRIL) 20 MG tablet Take 1 tablet by mouth Daily. 4/11/18   Debbi Jackson MD   Multiple Vitamins-Minerals (VISION-HECTOR PRESERVE PO) Take 1 capsule by mouth 2 (Two) Times a Day. PreserVision Lutein 226 mg-200 unit-5 mg-0.8 mg capsule     Jt Ley MD   simvastatin (ZOCOR) 20 MG tablet Take 1 tablet by mouth Every Night. Bedtime 4/11/18   Debbi Jackson MD   varenicline (CHANTIX CONTINUING MONTH PAK) 1 MG tablet Take 1 tablet by mouth 2 (Two) Times a Day. 10/11/18   Debbi Jackson MD   varenicline (CHANTIX STARTING MONTH PAK) 0.5 MG X 11 & 1 MG X 42 tablet Take 0.5 mg one daily on days 1-3 and and 0.5 mg twice daily on days 4-7.Then 1 mg twice daily for a total of 12 weeks. 10/11/18   Debbi Jackson MD       Past Medical History:   Diagnosis Date   • Abdominal aortic aneurysm (CMS/HCC)    • Carotid stenosis, bilateral    • Cataract    • Emphysema of lung (CMS/HCC)    • Herpes zoster    • Hyperlipidemia    • Hypertension        Past Surgical History:   Procedure Laterality Date   • CARDIAC CATHETERIZATION     • COLONOSCOPY W/ POLYPECTOMY     • HAND SURGERY         Social History     Socioeconomic History   • Marital status:      Spouse name: Not on file   • Number of children: Not on file   • Years of education: Not on file   • Highest education level: Not on file   Social Needs   • Financial resource strain: Not on file   • Food insecurity - worry: Not on file   • Food insecurity - inability: Not on file   •  Transportation needs - medical: Not on file   • Transportation needs - non-medical: Not on file   Occupational History   • Not on file   Tobacco Use   • Smoking status: Current Every Day Smoker     Packs/day: 1.00     Years: 60.00     Pack years: 60.00     Types: Cigarettes   • Smokeless tobacco: Former User   Substance and Sexual Activity   • Alcohol use: No   • Drug use: No   • Sexual activity: Not Currently   Other Topics Concern   • Not on file   Social History Narrative   • Not on file       Family History   Problem Relation Age of Onset   • Breast cancer Mother    • Hypertension Father          Closed displaced intertrochanteric fracture of left femur (CMS/HCC)    Hypertension    Emphysema of lung (CMS/HCC)    Atherosclerosis          Electronically signed by Wu Hare MD at 11/21/2018  1:14 PM   Source Note             Orthopaedic surg  Chief Complaint   Patient presents with   • Syncope   • Fall     Hip Pain: Patient complains of left hip pain. Onset of the symptoms was 12 hours ago. Inciting event: twisted while /. Current symptoms include is worse with weight bearing. Associated symptoms: injury, knee giving out, crepitus sensation. Aggravating symptoms: lateral movements, rising after sitting, standing and walking. Patient's course of pain: began worsening 12 hours ago. Patient has had prior hip problems. Previous visits for this problem: none. Evaluation to date: plain films, which were fracture.  Treatment to date: prescription analgesics, which have been effective.  Fell down  Review neg fever/ chills, nightsweats, eye/ear neg, cardiac/ pulm neg, gi/ gu neg, derm neg, no active bruising or bleeding, no swelling, c/o pain left hip, neg numbness or weakness  Neg psych    Past Medical History:   Diagnosis Date   • Abdominal aortic aneurysm (CMS/HCC)    • Carotid stenosis, bilateral    • Cataract    • Emphysema of lung (CMS/HCC)    • Herpes zoster    • Hyperlipidemia    • Hypertension       No current facility-administered medications on file prior to encounter.      Current Outpatient Medications on File Prior to Encounter   Medication Sig Dispense Refill   • aspirin 81 MG chewable tablet Chew 81 mg Daily.     • latanoprost (XALATAN) 0.005 % ophthalmic solution Administer 1 drop into the left eye Every Night.     • lisinopril (PRINIVIL,ZESTRIL) 20 MG tablet Take 1 tablet by mouth Daily. 90 tablet 3   • Multiple Vitamins-Minerals (VISION-HECTOR PRESERVE PO) Take 1 capsule by mouth 2 (Two) Times a Day. PreserVision Lutein 226 mg-200 unit-5 mg-0.8 mg capsule      • simvastatin (ZOCOR) 20 MG tablet Take 1 tablet by mouth Every Night. Bedtime 90 tablet 3   • varenicline (CHANTIX CONTINUING MONTH HELIO) 1 MG tablet Take 1 tablet by mouth 2 (Two) Times a Day. 60 tablet 3   • varenicline (CHANTIX STARTING MONTH PAK) 0.5 MG X 11 & 1 MG X 42 tablet Take 0.5 mg one daily on days 1-3 and and 0.5 mg twice daily on days 4-7.Then 1 mg twice daily for a total of 12 weeks. 53 tablet 0     Past Surgical History:   Procedure Laterality Date   • CARDIAC CATHETERIZATION     • COLONOSCOPY W/ POLYPECTOMY     • HAND SURGERY     No Known Allergies   Social History     Socioeconomic History   • Marital status:      Spouse name: Not on file   • Number of children: Not on file   • Years of education: Not on file   • Highest education level: Not on file   Social Needs   • Financial resource strain: Not on file   • Food insecurity - worry: Not on file   • Food insecurity - inability: Not on file   • Transportation needs - medical: Not on file   • Transportation needs - non-medical: Not on file   Occupational History   • Not on file   Tobacco Use   • Smoking status: Current Every Day Smoker     Packs/day: 1.00     Years: 60.00     Pack years: 60.00     Types: Cigarettes   • Smokeless tobacco: Former User   Substance and Sexual Activity   • Alcohol use: No   • Drug use: No   • Sexual activity: Not Currently   Other Topics  Concern   • Not on file   Social History Narrative   • Not on file     Family History   Problem Relation Age of Onset   • Breast cancer Mother    • Hypertension Father      Vitals:    11/20/18 1724   BP: 123/58   Pulse: 79   Resp: 18   Temp: 98.7 °F (37.1 °C)   SpO2: 97%     Elderly, alert and oriented  Perrla, no bruising or swelling  No jugular venous distention, neck alignment normal  Breathing comfortably  No wheezes  Abdomen soft, no masses  Pulse present in the extremities  Brisk capillary refill  No joint swelling, no rash  Skin warm and dry  No bruising,   No hyperreflexia, no clonus, CN II-Xii intact      Lab Results (last 24 hours)     Procedure Component Value Units Date/Time    Urinalysis, Microscopic Only - Urine, Catheter [[id number]]  (Abnormal) Collected:  11/20/18 1320    Specimen:  Urine, Catheter Updated:  11/20/18 1407     RBC, UA 3-5 /HPF      WBC, UA 21-30 /HPF      Bacteria, UA None Seen /HPF      Squamous Epithelial Cells, UA 6-12 /HPF      Hyaline Casts, UA 21-30 /LPF      Methodology Manual Light Microscopy    Urine Culture - Urine, Urine, Catheter [[id number]] Collected:  11/20/18 1320    Specimen:  Urine, Catheter Updated:  11/20/18 1407    Urinalysis With Culture If Indicated - Urine, Catheter [[id number]]  (Abnormal) Collected:  11/20/18 1320    Specimen:  Urine, Catheter Updated:  11/20/18 1342     Color, UA Yellow     Appearance, UA Cloudy     pH, UA 5.5     Specific Gravity, UA 1.020     Glucose, UA Negative     Ketones, UA Negative     Bilirubin, UA Negative     Blood, UA Trace     Protein,  mg/dL (2+)     Leuk Esterase, UA Negative     Nitrite, UA Negative     Urobilinogen, UA 0.2 E.U./dL    Protime-INR [[id number]]  (Normal) Collected:  11/20/18 1048    Specimen:  Blood Updated:  11/20/18 1304     Protime 14.0 Seconds      INR 1.10    Narrative:       Therapeutic range for most indications is 2.0-3.0 INR,  or 2.5-3.5 for mechanical heart valves.    aPTT [[id number]]  (Normal)  Collected:  11/20/18 1048    Specimen:  Blood Updated:  11/20/18 1304     PTT 33.3 seconds     Narrative:       The recommended Heparin therapeutic range is 68-97 seconds.    [place] Draw [[id number]] Collected:  11/20/18 1048    Specimen:  Blood Updated:  11/20/18 1200    Narrative:       The following orders were created for panel order [place] Draw.  Procedure                               Abnormality         Status                     ---------                               -----------         ------                     Light Blue Top[[id number]]                                   Final result               Green Top (Gel)[[id number]]                                  Final result               Lavender Top[[id number]]                                     Final result               Gold Top - SST[[id number]]                                   Final result                 Please view results for these tests on the individual orders.    Light Blue Top [[id number]] Collected:  11/20/18 1048    Specimen:  Blood Updated:  11/20/18 1200     Extra Tube hold for add-on     Comment: Auto resulted       Green Top (Gel) [[id number]] Collected:  11/20/18 1048    Specimen:  Blood Updated:  11/20/18 1200     Extra Tube Hold for add-ons.     Comment: Auto resulted.       Lavender Top [[id number]] Collected:  11/20/18 1048    Specimen:  Blood Updated:  11/20/18 1200     Extra Tube hold for add-on     Comment: Auto resulted       Gold Top - SST [[id number]] Collected:  11/20/18 1048    Specimen:  Blood Updated:  11/20/18 1200     Extra Tube Hold for add-ons.     Comment: Auto resulted.       Troponin [[id number]]  (Normal) Collected:  11/20/18 1048    Specimen:  Blood Updated:  11/20/18 1126     Troponin I <0.012 ng/mL     Comprehensive Metabolic Panel [[id number]]  (Abnormal) Collected:  11/20/18 1048    Specimen:  Blood Updated:  11/20/18 1115     Glucose 132 mg/dL      BUN 20 mg/dL      Creatinine 1.20 mg/dL      Sodium 136 mmol/L       Potassium 3.8 mmol/L      Chloride 102 mmol/L      CO2 26.0 mmol/L      Calcium 8.7 mg/dL      Total Protein 6.5 g/dL      Albumin 3.70 g/dL      ALT (SGPT) 18 U/L      AST (SGOT) 19 U/L      Alkaline Phosphatase 68 U/L      Total Bilirubin 1.1 mg/dL      eGFR Non African Amer 58 mL/min/1.73      Globulin 2.8 gm/dL      A/G Ratio 1.3 g/dL      BUN/Creatinine Ratio 16.7     Anion Gap 8.0 mmol/L     Narrative:       The MDRD GFR formula is only valid for adults with stable renal function between ages 18 and 70.    Magnesium [[id number]]  (Normal) Collected:  11/20/18 1048    Specimen:  Blood Updated:  11/20/18 1115     Magnesium 2.0 mg/dL     CBC & Differential [[id number]] Collected:  11/20/18 1048    Specimen:  Blood Updated:  11/20/18 1058    Narrative:       The following orders were created for panel order CBC & Differential.  Procedure                               Abnormality         Status                     ---------                               -----------         ------                     CBC Auto Differential[[id number]]        Abnormal            Final result                 Please view results for these tests on the individual orders.    CBC Auto Differential [[id number]]  (Abnormal) Collected:  11/20/18 1048    Specimen:  Blood Updated:  11/20/18 1058     WBC 17.16 10*3/mm3      RBC 4.06 10*6/mm3      Hemoglobin 12.7 g/dL      Hematocrit 36.6 %      MCV 90.1 fL      MCH 31.3 pg      MCHC 34.7 g/dL      RDW 12.5 %      RDW-SD 41.5 fl      MPV 8.6 fL      Platelets 227 10*3/mm3      Neutrophil % 83.2 %      Lymphocyte % 12.0 %      Monocyte % 3.8 %      Eosinophil % 0.5 %      Basophil % 0.2 %      Immature Grans % 0.3 %      Neutrophils, Absolute 14.29 10*3/mm3      Lymphocytes, Absolute 2.06 10*3/mm3      Monocytes, Absolute 0.65 10*3/mm3      Eosinophils, Absolute 0.08 10*3/mm3      Basophils, Absolute 0.03 10*3/mm3      Immature Grans, Absolute 0.05 10*3/mm3         Imaging Results (last 24 hours)      Procedure Component Value Units Date/Time    XR Femur 2 View Left [[id number]] Collected:  11/20/18 1257     Updated:  11/20/18 1326    Narrative:         EXAM:  Radiograph(s), Osseous         REGION:   Femur    SIDE:     Left     VIEWS:   2             INDICATION:    femur, S72.142A Displaced intertrochanteric  fracture of left femur, initial encounter for closed fracture  R91.8 Other nonspecific abnormal finding of lung field S72.22XA  Displaced subtrochanteric fracture of left femur, initial  encounter for closed fracture R55 Syncope and collapse     COMPARISON:    none              FINDINGS:           Minimally displaced intertrochanteric fracture.  .        Impression:       CONCLUSION:           1. Minimally displaced intertrochanteric fracture.                             Electronically signed by:  TRE Giraldo MD  11/20/2018 1:25  PM CST Workstation: [id number]    CT Angiogram Aorta [[id number]] Collected:  11/20/18 1158     Updated:  11/20/18 1253    Narrative:       Procedure: CT angiogram abdominal aorta with contrast    Reason for exam: Syncope, suspect AAA.    FINDINGS: Axial computer tomography sequential imaging was  performed from the diaphragms through the symphysis pubis after  dynamic bolus contrast injection. Sagittal and coronal  reformation was performed. This exam was performed according to  our departmental dose optimization program, which includes  automated exposure control, adjustment of the mA and/or KV  according to patient size and/or use of iterative reconstruction  technique. 3-D vascular reconstruction of the abdominal aorta was  performed.    CTA findings: Proximal abdominal aortic ectasia measuring 2.5 x  2.4 cm in AP and transverse dimension. No evidence of abdominal  aortic aneurysm. No evidence of aortic dissection. Abdominal  aorta soft and calcified atherosclerotic plaque are seen none of  which appear hemodynamically significant. The celiac trunk is  normal. Proximal  superior mesenteric artery partially calcified  atherosclerotic plaque which is causing 5-10% diameter stenosis.  The superior mesenteric artery is otherwise normal. The inferior  mesenteric artery is normal. The right renal artery is normal.  The left renal artery is normal. The right common iliac artery  has small foci of calcified atherosclerotic plaque none of which  appear hemodynamically significant. The right internal iliac  artery has proximal small calcified atherosclerotic plaque not  hemodynamically significant. The right external iliac artery is  normal. Proximal right common femoral artery partially calcified  atherosclerotic plaque which is causing 30% diameter stenosis.  Otherwise the right common femoral artery is unremarkable.  Proximal right deep profunda artery and right superficial femoral  artery are normal. The left common iliac artery distal soft  atherosclerotic plaque which is causing 40-50% diameter stenosis.  The left internal iliac artery has a few proximal calcified  atherosclerotic plaque which are not hemodynamically significant.  The left external iliac artery appears normal. The left common  femoral artery has soft atherosclerotic plaque which is causing  40-50% diameter stenosis. Proximal left deep profunda artery soft  and calcified atherosclerotic plaque which is causing significant  stenosis of 80% or greater. Proximal left superficial femoral  artery appears normal.    The lung bases are unremarkable. Multiple left lobe of liver and  single posterior segment right lobe of liver small benign simple  hepatic cyst. Otherwise the liver is unremarkable. The  gallbladder is normal. The biliary system is normal. The pancreas  is normal. The spleen is normal. Bilateral adrenal glands are  normal. Right kidney mid zone small subcentimeter simple renal  cortical cyst. Right kidney lower pole circular hypodense lesion  which has Hounsfield units 28. This most likely represents  benign  hyperdense cyst. However, recommend follow-up CT in 3-6  months  to document stability. Otherwise right kidney and ureter are  normal. Left kidney upper pole simple renal cortical cysts which  measures 2.9 cm in greatest diameter. Right kidney mid zone small  subcentimeter simple renal cortical cyst. Otherwise left kidney  and ureter are normal. The bladder is normal. The prostate gland  appears enlarged and is impinging upon the base of bladder.  Multiple sigmoid colon diverticula. Multiple descending colon  diverticula. Otherwise hollow viscera is unremarkable. No  lymphadenopathy in the abdomen or the pelvis. Left femur  comminuted intertrochanteric fracture. Otherwise no acute osseous  abnormality.      Impression:       1.  CTA findings as described above.  2.  Multiple liver benign hepatic cyst.  3.  Right kidney lower pole circular hypodense lesion which has  Hounsfield units of 28. This most likely represents benign  hyperdense cyst. However, recommend follow-up CT in 3-6 months to  document stability.  4.  Additional bilateral kidney simple renal cortical cyst.  5.  Mildly enlarged prostate gland may represent changes from  benign prostatic hypertrophy versus prostate malignancy.  Recommend clinical correlation.  6.  Left femur comminuted intertrochanteric fracture.  7.  Colonic diverticulosis.    Electronically signed by:  Tulio Melendrez MD  11/20/2018 12:51 PM CST  Workstation: [id number]    CT Head Without Contrast [[id number]] Collected:  11/20/18 1113     Updated:  11/20/18 1206    Narrative:         CT Head Without Contrast    History: Syncope    Axial scans of the brain were obtained without intravenous  contrast.  Coronal and sagital reconstructions were preformed.    This exam was performed according to our departmental  dose-optimization program, which includes automated exposure  control, adjustment of the mA and/or kV according to patient size  and/or use of iterative reconstruction  technique.    DLP: 908.20    Comparison: April 24, 2017    Bone windows are unremarkable.  The visualized paranasal sinuses are unremarkable.    No acute process.  Cerebral atrophy.  Minimal small vessel disease.  No hemorrhage.  No mass.  No abnormal areas of increased attenuation.  No midline shift.  No abnormal extra-axial fluid collections.      Impression:       CONCLUSION:  No acute process.  Cerebral atrophy.  Minimal small vessel disease.    [id number]    Electronically signed by:  Jose Handley MD  11/20/2018 11:54 AM  CST Workstation: [id number]    XR Hip With or Without Pelvis 2 - 3 View Left [[id number]] Collected:  11/20/18 1052     Updated:  11/20/18 1206    Narrative:       Procedure: XR HIP 2 OR MORE VIEWS UNILATERAL WITH PELVIS.    History: fall, left hip pain, inability to bear weight.    Comparison: None    Findings:  Frontal view of the pelvis and two views of the left hip were  obtained, demonstrating a displaced, possibly comminuted,  medially angulated intertrochanteric fracture of the left  proximal femur. No other fractures are seen. There are  degenerative changes of the lower lumbar spine. The bones appear  demineralized.      Impression:       Impression: Displaced, possibly comminuted, medially angulated  intertrochanteric fracture of the left proximal femur.     Electronically signed by:  Tone Olivas MD  11/20/2018 11:47 AM  CST Workstation: [id number]    XR Chest 1 View [[id number]] Collected:  11/20/18 1102     Updated:  11/20/18 1145    Narrative:         EXAM:         Radiograph(s), Chest   VIEWS:   Frontal  ; 1       DATE/TIME:  11/20/2018 11:43 AM CST                INDICATION:   pre op, hip fracture    COMPARISON:  CXR: none             FINDINGS:             - lines/tubes:    none     - cardiac:         size within normal limits         - mediastinum: Fullness in the right paratracheal and right  suprahilar regions, suspicious for the presence of a mass lesion.          - lungs:         no  focal air space process, pulmonary  interstitial edema, nodule(s)/mass             - pleura:         no evidence of  fluid                  - osseous:         unremarkable for age                  - misc.:         Impression:       CONCLUSION:        1. Mass lesion suspected present in the right suprahilar region.  Correlation with contrast-enhanced CT recommended.                                                              Electronically signed by:  TRE Giraldo MD  11/20/2018 11:44  AM [facility] Workstation: [id number]          Xray left hip transtrochanteric femur fracture, displaced     Diagnosis Plan   1. Closed displaced intertrochanteric fracture of left femur, initial encounter (CMS/[facility])     2. Mass of right lung     3. Closed displaced subtrochanteric fracture of left femur, initial encounter (CMS/[facility])  Case Request    Case Request   4. Syncope, unspecified syncope type       Recommend surgery for fixation of /L femur fracture, no good option for non operative treatment.  Surgery will require mechanical fixation, described to him.  Risks of surgery include deep or superficial infection hematoma, wound healing complications malpositioning of instrumentation, loosening, non union, malunion, persistent pain, and additional surgery/ revision surgery and blood transfusion.  Nerve or vascular injury is possible.  Medical risks of anesthesia include death, heart attack heart arrhythmia, heart failure stroke gastric ulceration perforation peritonitis deep vein thrombosis pulmonary embolus pneumonia pulmonary failure necessitating prolonged ventilation renal failure sepsis and multiorgan system failure.  All risks have been reviewed extensively.  There are no guarantees.  Surgery is posted for scheduling, canceled by anesthesia service due to risk for stroke.       Electronically signed by Wu Hare MD at 11/20/2018  9:34 PM             Wu Hare MD at 11/21/2018  1:12 PM          H&P  reviewed. The patient was examined and there are no changes to the H&P.      Temp:  [96.9 °F (36.1 °C)-99.8 °F (37.7 °C)] 97.7 °F (36.5 °C)  Heart Rate:  [66-97] 90  Resp:  [18-20] 18  BP: (123-160)/(58-90) 149/69    No Known Allergies    Prior to Admission medications    Medication Sig Start Date End Date Taking? Authorizing Provider   aspirin 81 MG chewable tablet Chew 81 mg Daily.    Jt Ley MD   latanoprost (XALATAN) 0.005 % ophthalmic solution Administer 1 drop into the left eye Every Night.    Jt Ley MD   lisinopril (PRINIVIL,ZESTRIL) 20 MG tablet Take 1 tablet by mouth Daily. 4/11/18   Debbi Jackson MD   Multiple Vitamins-Minerals (VISION-HECTOR PRESERVE PO) Take 1 capsule by mouth 2 (Two) Times a Day. PreserVision Lutein 226 mg-200 unit-5 mg-0.8 mg capsule     Jt Ley MD   simvastatin (ZOCOR) 20 MG tablet Take 1 tablet by mouth Every Night. Bedtime 4/11/18   Debbi Jackson MD   varenicline (CHANTIX CONTINUING MONTH PAK) 1 MG tablet Take 1 tablet by mouth 2 (Two) Times a Day. 10/11/18   Debbi Jackson MD   varenicline (CHANTIX STARTING MONTH PAK) 0.5 MG X 11 & 1 MG X 42 tablet Take 0.5 mg one daily on days 1-3 and and 0.5 mg twice daily on days 4-7.Then 1 mg twice daily for a total of 12 weeks. 10/11/18   Debbi Jackson MD       Past Medical History:   Diagnosis Date   • Abdominal aortic aneurysm (CMS/HCC)    • Carotid stenosis, bilateral    • Cataract    • Emphysema of lung (CMS/HCC)    • Herpes zoster    • Hyperlipidemia    • Hypertension        Past Surgical History:   Procedure Laterality Date   • CARDIAC CATHETERIZATION     • COLONOSCOPY W/ POLYPECTOMY     • HAND SURGERY         Social History     Socioeconomic History   • Marital status:      Spouse name: Not on file   • Number of children: Not on file   • Years of education: Not on file   • Highest education level: Not on file   Social Needs   • Financial resource strain: Not on file   •  Food insecurity - worry: Not on file   • Food insecurity - inability: Not on file   • Transportation needs - medical: Not on file   • Transportation needs - non-medical: Not on file   Occupational History   • Not on file   Tobacco Use   • Smoking status: Current Every Day Smoker     Packs/day: 1.00     Years: 60.00     Pack years: 60.00     Types: Cigarettes   • Smokeless tobacco: Former User   Substance and Sexual Activity   • Alcohol use: No   • Drug use: No   • Sexual activity: Not Currently   Other Topics Concern   • Not on file   Social History Narrative   • Not on file       Family History   Problem Relation Age of Onset   • Breast cancer Mother    • Hypertension Father          Closed displaced intertrochanteric fracture of left femur (CMS/HCC)    Hypertension    Emphysema of lung (CMS/HCC)    Atherosclerosis          Electronically signed by Wu Hare MD at 11/21/2018  1:12 PM   Source Note             Orthopaedic surg  Chief Complaint   Patient presents with   • Syncope   • Fall     Hip Pain: Patient complains of left hip pain. Onset of the symptoms was 12 hours ago. Inciting event: twisted while /. Current symptoms include is worse with weight bearing. Associated symptoms: injury, knee giving out, crepitus sensation. Aggravating symptoms: lateral movements, rising after sitting, standing and walking. Patient's course of pain: began worsening 12 hours ago. Patient has had prior hip problems. Previous visits for this problem: none. Evaluation to date: plain films, which were fracture.  Treatment to date: prescription analgesics, which have been effective.  Fell down  Review neg fever/ chills, nightsweats, eye/ear neg, cardiac/ pulm neg, gi/ gu neg, derm neg, no active bruising or bleeding, no swelling, c/o pain left hip, neg numbness or weakness  Neg psych    Past Medical History:   Diagnosis Date   • Abdominal aortic aneurysm (CMS/HCC)    • Carotid stenosis, bilateral    • Cataract    •  Emphysema of lung (CMS/HCC)    • Herpes zoster    • Hyperlipidemia    • Hypertension      No current facility-administered medications on file prior to encounter.      Current Outpatient Medications on File Prior to Encounter   Medication Sig Dispense Refill   • aspirin 81 MG chewable tablet Chew 81 mg Daily.     • latanoprost (XALATAN) 0.005 % ophthalmic solution Administer 1 drop into the left eye Every Night.     • lisinopril (PRINIVIL,ZESTRIL) 20 MG tablet Take 1 tablet by mouth Daily. 90 tablet 3   • Multiple Vitamins-Minerals (VISION-HECTOR PRESERVE PO) Take 1 capsule by mouth 2 (Two) Times a Day. PreserVision Lutein 226 mg-200 unit-5 mg-0.8 mg capsule      • simvastatin (ZOCOR) 20 MG tablet Take 1 tablet by mouth Every Night. Bedtime 90 tablet 3   • varenicline (CHANTIX CONTINUING MONTH PAK) 1 MG tablet Take 1 tablet by mouth 2 (Two) Times a Day. 60 tablet 3   • varenicline (CHANTIX STARTING MONTH PAK) 0.5 MG X 11 & 1 MG X 42 tablet Take 0.5 mg one daily on days 1-3 and and 0.5 mg twice daily on days 4-7.Then 1 mg twice daily for a total of 12 weeks. 53 tablet 0     Past Surgical History:   Procedure Laterality Date   • CARDIAC CATHETERIZATION     • COLONOSCOPY W/ POLYPECTOMY     • HAND SURGERY     No Known Allergies   Social History     Socioeconomic History   • Marital status:      Spouse name: Not on file   • Number of children: Not on file   • Years of education: Not on file   • Highest education level: Not on file   Social Needs   • Financial resource strain: Not on file   • Food insecurity - worry: Not on file   • Food insecurity - inability: Not on file   • Transportation needs - medical: Not on file   • Transportation needs - non-medical: Not on file   Occupational History   • Not on file   Tobacco Use   • Smoking status: Current Every Day Smoker     Packs/day: 1.00     Years: 60.00     Pack years: 60.00     Types: Cigarettes   • Smokeless tobacco: Former User   Substance and Sexual Activity   •  Alcohol use: No   • Drug use: No   • Sexual activity: Not Currently   Other Topics Concern   • Not on file   Social History Narrative   • Not on file     Family History   Problem Relation Age of Onset   • Breast cancer Mother    • Hypertension Father      Vitals:    11/20/18 1724   BP: 123/58   Pulse: 79   Resp: 18   Temp: 98.7 °F (37.1 °C)   SpO2: 97%     Elderly, alert and oriented  Perrla, no bruising or swelling  No jugular venous distention, neck alignment normal  Breathing comfortably  No wheezes  Abdomen soft, no masses  Pulse present in the extremities  Brisk capillary refill  No joint swelling, no rash  Skin warm and dry  No bruising,   No hyperreflexia, no clonus, CN II-Xii intact      Lab Results (last 24 hours)     Procedure Component Value Units Date/Time    Urinalysis, Microscopic Only - Urine, Catheter [[id number]]  (Abnormal) Collected:  11/20/18 1320    Specimen:  Urine, Catheter Updated:  11/20/18 1407     RBC, UA 3-5 /HPF      WBC, UA 21-30 /HPF      Bacteria, UA None Seen /HPF      Squamous Epithelial Cells, UA 6-12 /HPF      Hyaline Casts, UA 21-30 /LPF      Methodology Manual Light Microscopy    Urine Culture - Urine, Urine, Catheter [[id number]] Collected:  11/20/18 1320    Specimen:  Urine, Catheter Updated:  11/20/18 1407    Urinalysis With Culture If Indicated - Urine, Catheter [[id number]]  (Abnormal) Collected:  11/20/18 1320    Specimen:  Urine, Catheter Updated:  11/20/18 1342     Color, UA Yellow     Appearance, UA Cloudy     pH, UA 5.5     Specific Gravity, UA 1.020     Glucose, UA Negative     Ketones, UA Negative     Bilirubin, UA Negative     Blood, UA Trace     Protein,  mg/dL (2+)     Leuk Esterase, UA Negative     Nitrite, UA Negative     Urobilinogen, UA 0.2 E.U./dL    Protime-INR [[id number]]  (Normal) Collected:  11/20/18 1048    Specimen:  Blood Updated:  11/20/18 1304     Protime 14.0 Seconds      INR 1.10    Narrative:       Therapeutic range for most indications is  2.0-3.0 INR,  or 2.5-3.5 for mechanical heart valves.    aPTT [[id number]]  (Normal) Collected:  11/20/18 1048    Specimen:  Blood Updated:  11/20/18 1304     PTT 33.3 seconds     Narrative:       The recommended Heparin therapeutic range is 68-97 seconds.    [place] Draw [[id number]] Collected:  11/20/18 1048    Specimen:  Blood Updated:  11/20/18 1200    Narrative:       The following orders were created for panel order [place] Draw.  Procedure                               Abnormality         Status                     ---------                               -----------         ------                     Light Blue Top[[id number]]                                   Final result               Green Top (Gel)[[id number]]                                  Final result               Lavender Top[[id number]]                                     Final result               Gold Top - SST[[id number]]                                   Final result                 Please view results for these tests on the individual orders.    Light Blue Top [[id number]] Collected:  11/20/18 1048    Specimen:  Blood Updated:  11/20/18 1200     Extra Tube hold for add-on     Comment: Auto resulted       Green Top (Gel) [[id number]] Collected:  11/20/18 1048    Specimen:  Blood Updated:  11/20/18 1200     Extra Tube Hold for add-ons.     Comment: Auto resulted.       Lavender Top [[id number]] Collected:  11/20/18 1048    Specimen:  Blood Updated:  11/20/18 1200     Extra Tube hold for add-on     Comment: Auto resulted       Gold Top - SST [[id number]] Collected:  11/20/18 1048    Specimen:  Blood Updated:  11/20/18 1200     Extra Tube Hold for add-ons.     Comment: Auto resulted.       Troponin [[id number]]  (Normal) Collected:  11/20/18 1048    Specimen:  Blood Updated:  11/20/18 1126     Troponin I <0.012 ng/mL     Comprehensive Metabolic Panel [[id number]]  (Abnormal) Collected:  11/20/18 1048    Specimen:  Blood Updated:  11/20/18 1115     Glucose 132  mg/dL      BUN 20 mg/dL      Creatinine 1.20 mg/dL      Sodium 136 mmol/L      Potassium 3.8 mmol/L      Chloride 102 mmol/L      CO2 26.0 mmol/L      Calcium 8.7 mg/dL      Total Protein 6.5 g/dL      Albumin 3.70 g/dL      ALT (SGPT) 18 U/L      AST (SGOT) 19 U/L      Alkaline Phosphatase 68 U/L      Total Bilirubin 1.1 mg/dL      eGFR Non African Amer 58 mL/min/1.73      Globulin 2.8 gm/dL      A/G Ratio 1.3 g/dL      BUN/Creatinine Ratio 16.7     Anion Gap 8.0 mmol/L     Narrative:       The MDRD GFR formula is only valid for adults with stable renal function between ages 18 and 70.    Magnesium [[id number]]  (Normal) Collected:  11/20/18 1048    Specimen:  Blood Updated:  11/20/18 1115     Magnesium 2.0 mg/dL     CBC & Differential [[id number]] Collected:  11/20/18 1048    Specimen:  Blood Updated:  11/20/18 1058    Narrative:       The following orders were created for panel order CBC & Differential.  Procedure                               Abnormality         Status                     ---------                               -----------         ------                     CBC Auto Differential[[id number]]        Abnormal            Final result                 Please view results for these tests on the individual orders.    CBC Auto Differential [[id number]]  (Abnormal) Collected:  11/20/18 1048    Specimen:  Blood Updated:  11/20/18 1058     WBC 17.16 10*3/mm3      RBC 4.06 10*6/mm3      Hemoglobin 12.7 g/dL      Hematocrit 36.6 %      MCV 90.1 fL      MCH 31.3 pg      MCHC 34.7 g/dL      RDW 12.5 %      RDW-SD 41.5 fl      MPV 8.6 fL      Platelets 227 10*3/mm3      Neutrophil % 83.2 %      Lymphocyte % 12.0 %      Monocyte % 3.8 %      Eosinophil % 0.5 %      Basophil % 0.2 %      Immature Grans % 0.3 %      Neutrophils, Absolute 14.29 10*3/mm3      Lymphocytes, Absolute 2.06 10*3/mm3      Monocytes, Absolute 0.65 10*3/mm3      Eosinophils, Absolute 0.08 10*3/mm3      Basophils, Absolute 0.03 10*3/mm3       Immature Grans, Absolute 0.05 10*3/mm3         Imaging Results (last 24 hours)     Procedure Component Value Units Date/Time    XR Femur 2 View Left [[id number]] Collected:  11/20/18 1257     Updated:  11/20/18 1326    Narrative:         EXAM:  Radiograph(s), Osseous         REGION:   Femur    SIDE:     Left     VIEWS:   2             INDICATION:    femur, S72.142A Displaced intertrochanteric  fracture of left femur, initial encounter for closed fracture  R91.8 Other nonspecific abnormal finding of lung field S72.22XA  Displaced subtrochanteric fracture of left femur, initial  encounter for closed fracture R55 Syncope and collapse     COMPARISON:    none              FINDINGS:           Minimally displaced intertrochanteric fracture.  .        Impression:       CONCLUSION:           1. Minimally displaced intertrochanteric fracture.                             Electronically signed by:  TRE Giraldo MD  11/20/2018 1:25  PM CST Workstation: [id number]    CT Angiogram Aorta [[id number]] Collected:  11/20/18 1158     Updated:  11/20/18 1253    Narrative:       Procedure: CT angiogram abdominal aorta with contrast    Reason for exam: Syncope, suspect AAA.    FINDINGS: Axial computer tomography sequential imaging was  performed from the diaphragms through the symphysis pubis after  dynamic bolus contrast injection. Sagittal and coronal  reformation was performed. This exam was performed according to  our departmental dose optimization program, which includes  automated exposure control, adjustment of the mA and/or KV  according to patient size and/or use of iterative reconstruction  technique. 3-D vascular reconstruction of the abdominal aorta was  performed.    CTA findings: Proximal abdominal aortic ectasia measuring 2.5 x  2.4 cm in AP and transverse dimension. No evidence of abdominal  aortic aneurysm. No evidence of aortic dissection. Abdominal  aorta soft and calcified atherosclerotic plaque are seen none  of  which appear hemodynamically significant. The celiac trunk is  normal. Proximal superior mesenteric artery partially calcified  atherosclerotic plaque which is causing 5-10% diameter stenosis.  The superior mesenteric artery is otherwise normal. The inferior  mesenteric artery is normal. The right renal artery is normal.  The left renal artery is normal. The right common iliac artery  has small foci of calcified atherosclerotic plaque none of which  appear hemodynamically significant. The right internal iliac  artery has proximal small calcified atherosclerotic plaque not  hemodynamically significant. The right external iliac artery is  normal. Proximal right common femoral artery partially calcified  atherosclerotic plaque which is causing 30% diameter stenosis.  Otherwise the right common femoral artery is unremarkable.  Proximal right deep profunda artery and right superficial femoral  artery are normal. The left common iliac artery distal soft  atherosclerotic plaque which is causing 40-50% diameter stenosis.  The left internal iliac artery has a few proximal calcified  atherosclerotic plaque which are not hemodynamically significant.  The left external iliac artery appears normal. The left common  femoral artery has soft atherosclerotic plaque which is causing  40-50% diameter stenosis. Proximal left deep profunda artery soft  and calcified atherosclerotic plaque which is causing significant  stenosis of 80% or greater. Proximal left superficial femoral  artery appears normal.    The lung bases are unremarkable. Multiple left lobe of liver and  single posterior segment right lobe of liver small benign simple  hepatic cyst. Otherwise the liver is unremarkable. The  gallbladder is normal. The biliary system is normal. The pancreas  is normal. The spleen is normal. Bilateral adrenal glands are  normal. Right kidney mid zone small subcentimeter simple renal  cortical cyst. Right kidney lower pole circular  hypodense lesion  which has Hounsfield units 28. This most likely represents benign  hyperdense cyst. However, recommend follow-up CT in 3-6  months  to document stability. Otherwise right kidney and ureter are  normal. Left kidney upper pole simple renal cortical cysts which  measures 2.9 cm in greatest diameter. Right kidney mid zone small  subcentimeter simple renal cortical cyst. Otherwise left kidney  and ureter are normal. The bladder is normal. The prostate gland  appears enlarged and is impinging upon the base of bladder.  Multiple sigmoid colon diverticula. Multiple descending colon  diverticula. Otherwise hollow viscera is unremarkable. No  lymphadenopathy in the abdomen or the pelvis. Left femur  comminuted intertrochanteric fracture. Otherwise no acute osseous  abnormality.      Impression:       1.  CTA findings as described above.  2.  Multiple liver benign hepatic cyst.  3.  Right kidney lower pole circular hypodense lesion which has  Hounsfield units of 28. This most likely represents benign  hyperdense cyst. However, recommend follow-up CT in 3-6 months to  document stability.  4.  Additional bilateral kidney simple renal cortical cyst.  5.  Mildly enlarged prostate gland may represent changes from  benign prostatic hypertrophy versus prostate malignancy.  Recommend clinical correlation.  6.  Left femur comminuted intertrochanteric fracture.  7.  Colonic diverticulosis.    Electronically signed by:  Tulio Melendrez MD  11/20/2018 12:51 PM [facility]  Workstation: [id number]    CT Head Without Contrast [[id number]] Collected:  11/20/18 1113     Updated:  11/20/18 1206    Narrative:         CT Head Without Contrast    History: Syncope    Axial scans of the brain were obtained without intravenous  contrast.  Coronal and sagital reconstructions were preformed.    This exam was performed according to our departmental  dose-optimization program, which includes automated exposure  control, adjustment of the mA and/or kV  according to patient size  and/or use of iterative reconstruction technique.    DLP: 908.20    Comparison: April 24, 2017    Bone windows are unremarkable.  The visualized paranasal sinuses are unremarkable.    No acute process.  Cerebral atrophy.  Minimal small vessel disease.  No hemorrhage.  No mass.  No abnormal areas of increased attenuation.  No midline shift.  No abnormal extra-axial fluid collections.      Impression:       CONCLUSION:  No acute process.  Cerebral atrophy.  Minimal small vessel disease.    [id number]    Electronically signed by:  Jose Handley MD  11/20/2018 11:54 AM  CST Workstation: [id number]    XR Hip With or Without Pelvis 2 - 3 View Left [[id number]] Collected:  11/20/18 1052     Updated:  11/20/18 1206    Narrative:       Procedure: XR HIP 2 OR MORE VIEWS UNILATERAL WITH PELVIS.    History: fall, left hip pain, inability to bear weight.    Comparison: None    Findings:  Frontal view of the pelvis and two views of the left hip were  obtained, demonstrating a displaced, possibly comminuted,  medially angulated intertrochanteric fracture of the left  proximal femur. No other fractures are seen. There are  degenerative changes of the lower lumbar spine. The bones appear  demineralized.      Impression:       Impression: Displaced, possibly comminuted, medially angulated  intertrochanteric fracture of the left proximal femur.     Electronically signed by:  Tone Olivas MD  11/20/2018 11:47 AM  CST Workstation: [id number]    XR Chest 1 View [[id number]] Collected:  11/20/18 1102     Updated:  11/20/18 1145    Narrative:         EXAM:         Radiograph(s), Chest   VIEWS:   Frontal  ; 1       DATE/TIME:  11/20/2018 11:43 AM CST                INDICATION:   pre op, hip fracture    COMPARISON:  CXR: none             FINDINGS:             - lines/tubes:    none     - cardiac:         size within normal limits         - mediastinum: Fullness in the right paratracheal and right  suprahilar regions, suspicious  for the presence of a mass lesion.          - lungs:         no focal air space process, pulmonary  interstitial edema, nodule(s)/mass             - pleura:         no evidence of  fluid                  - osseous:         unremarkable for age                  - misc.:         Impression:       CONCLUSION:        1. Mass lesion suspected present in the right suprahilar region.  Correlation with contrast-enhanced CT recommended.                                                              Electronically signed by:  TRE Giraldo MD  11/20/2018 11:44  AM CST Workstation: [id number]          Xray left hip transtrochanteric femur fracture, displaced     Diagnosis Plan   1. Closed displaced intertrochanteric fracture of left femur, initial encounter (CMS/[facility])     2. Mass of right lung     3. Closed displaced subtrochanteric fracture of left femur, initial encounter (CMS/[facility])  Case Request    Case Request   4. Syncope, unspecified syncope type       Recommend surgery for fixation of /L femur fracture, no good option for non operative treatment.  Surgery will require mechanical fixation, described to him.  Risks of surgery include deep or superficial infection hematoma, wound healing complications malpositioning of instrumentation, loosening, non union, malunion, persistent pain, and additional surgery/ revision surgery and blood transfusion.  Nerve or vascular injury is possible.  Medical risks of anesthesia include death, heart attack heart arrhythmia, heart failure stroke gastric ulceration perforation peritonitis deep vein thrombosis pulmonary embolus pneumonia pulmonary failure necessitating prolonged ventilation renal failure sepsis and multiorgan system failure.  All risks have been reviewed extensively.  There are no guarantees.  Surgery is posted for scheduling, canceled by anesthesia service due to risk for stroke.       Electronically signed by Wu Hare MD at 11/20/2018  9:34 PM              Wu Hare MD at 11/20/2018  5:46 PM          Orthopaedic surg  Chief Complaint   Patient presents with   • Syncope   • Fall     Hip Pain: Patient complains of left hip pain. Onset of the symptoms was 12 hours ago. Inciting event: twisted while /. Current symptoms include is worse with weight bearing. Associated symptoms: injury, knee giving out, crepitus sensation. Aggravating symptoms: lateral movements, rising after sitting, standing and walking. Patient's course of pain: began worsening 12 hours ago. Patient has had prior hip problems. Previous visits for this problem: none. Evaluation to date: plain films, which were fracture.  Treatment to date: prescription analgesics, which have been effective.  Fell down  Review neg fever/ chills, nightsweats, eye/ear neg, cardiac/ pulm neg, gi/ gu neg, derm neg, no active bruising or bleeding, no swelling, c/o pain left hip, neg numbness or weakness  Neg psych    Past Medical History:   Diagnosis Date   • Abdominal aortic aneurysm (CMS/[facility])    • Carotid stenosis, bilateral    • Cataract    • Emphysema of lung (CMS/[facility])    • Herpes zoster    • Hyperlipidemia    • Hypertension      No current facility-administered medications on file prior to encounter.      Current Outpatient Medications on File Prior to Encounter   Medication Sig Dispense Refill   • aspirin 81 MG chewable tablet Chew 81 mg Daily.     • latanoprost (XALATAN) 0.005 % ophthalmic solution Administer 1 drop into the left eye Every Night.     • lisinopril (PRINIVIL,ZESTRIL) 20 MG tablet Take 1 tablet by mouth Daily. 90 tablet 3   • Multiple Vitamins-Minerals (VISION-HECTOR PRESERVE PO) Take 1 capsule by mouth 2 (Two) Times a Day. PreserVision Lutein 226 mg-200 unit-5 mg-0.8 mg capsule      • simvastatin (ZOCOR) 20 MG tablet Take 1 tablet by mouth Every Night. Bedtime 90 tablet 3   • varenicline (CHANTIX CONTINUING MONTH HELIO) 1 MG tablet Take 1 tablet by mouth 2 (Two) Times a Day. 60 tablet 3   •  varenicline (CHANTIX STARTING MONTH PAK) 0.5 MG X 11 & 1 MG X 42 tablet Take 0.5 mg one daily on days 1-3 and and 0.5 mg twice daily on days 4-7.Then 1 mg twice daily for a total of 12 weeks. 53 tablet 0     Past Surgical History:   Procedure Laterality Date   • CARDIAC CATHETERIZATION     • COLONOSCOPY W/ POLYPECTOMY     • HAND SURGERY     No Known Allergies   Social History     Socioeconomic History   • Marital status:      Spouse name: Not on file   • Number of children: Not on file   • Years of education: Not on file   • Highest education level: Not on file   Social Needs   • Financial resource strain: Not on file   • Food insecurity - worry: Not on file   • Food insecurity - inability: Not on file   • Transportation needs - medical: Not on file   • Transportation needs - non-medical: Not on file   Occupational History   • Not on file   Tobacco Use   • Smoking status: Current Every Day Smoker     Packs/day: 1.00     Years: 60.00     Pack years: 60.00     Types: Cigarettes   • Smokeless tobacco: Former User   Substance and Sexual Activity   • Alcohol use: No   • Drug use: No   • Sexual activity: Not Currently   Other Topics Concern   • Not on file   Social History Narrative   • Not on file     Family History   Problem Relation Age of Onset   • Breast cancer Mother    • Hypertension Father      Vitals:    11/20/18 1724   BP: 123/58   Pulse: 79   Resp: 18   Temp: 98.7 °F (37.1 °C)   SpO2: 97%     Elderly, alert and oriented  Perrla, no bruising or swelling  No jugular venous distention, neck alignment normal  Breathing comfortably  No wheezes  Abdomen soft, no masses  Pulse present in the extremities  Brisk capillary refill  No joint swelling, no rash  Skin warm and dry  No bruising,   No hyperreflexia, no clonus, CN II-Xii intact      Lab Results (last 24 hours)     Procedure Component Value Units Date/Time    Urinalysis, Microscopic Only - Urine, Catheter [[id number]]  (Abnormal) Collected:  11/20/18 1320     Specimen:  Urine, Catheter Updated:  11/20/18 1407     RBC, UA 3-5 /HPF      WBC, UA 21-30 /HPF      Bacteria, UA None Seen /HPF      Squamous Epithelial Cells, UA 6-12 /HPF      Hyaline Casts, UA 21-30 /LPF      Methodology Manual Light Microscopy    Urine Culture - Urine, Urine, Catheter [[id number]] Collected:  11/20/18 1320    Specimen:  Urine, Catheter Updated:  11/20/18 1407    Urinalysis With Culture If Indicated - Urine, Catheter [[id number]]  (Abnormal) Collected:  11/20/18 1320    Specimen:  Urine, Catheter Updated:  11/20/18 1342     Color, UA Yellow     Appearance, UA Cloudy     pH, UA 5.5     Specific Gravity, UA 1.020     Glucose, UA Negative     Ketones, UA Negative     Bilirubin, UA Negative     Blood, UA Trace     Protein,  mg/dL (2+)     Leuk Esterase, UA Negative     Nitrite, UA Negative     Urobilinogen, UA 0.2 E.U./dL    Protime-INR [[id number]]  (Normal) Collected:  11/20/18 1048    Specimen:  Blood Updated:  11/20/18 1304     Protime 14.0 Seconds      INR 1.10    Narrative:       Therapeutic range for most indications is 2.0-3.0 INR,  or 2.5-3.5 for mechanical heart valves.    aPTT [[id number]]  (Normal) Collected:  11/20/18 1048    Specimen:  Blood Updated:  11/20/18 1304     PTT 33.3 seconds     Narrative:       The recommended Heparin therapeutic range is 68-97 seconds.    [place] Draw [[id number]] Collected:  11/20/18 1048    Specimen:  Blood Updated:  11/20/18 1200    Narrative:       The following orders were created for panel order [place] Draw.  Procedure                               Abnormality         Status                     ---------                               -----------         ------                     Light Blue Top[[id number]]                                   Final result               Green Top (Gel)[[id number]]                                  Final result               Lavender Top[[id number]]                                     Final result               Gold Top  - SST[[id number]]                                   Final result                 Please view results for these tests on the individual orders.    Light Blue Top [[id number]] Collected:  11/20/18 1048    Specimen:  Blood Updated:  11/20/18 1200     Extra Tube hold for add-on     Comment: Auto resulted       Green Top (Gel) [[id number]] Collected:  11/20/18 1048    Specimen:  Blood Updated:  11/20/18 1200     Extra Tube Hold for add-ons.     Comment: Auto resulted.       Lavender Top [[id number]] Collected:  11/20/18 1048    Specimen:  Blood Updated:  11/20/18 1200     Extra Tube hold for add-on     Comment: Auto resulted       Gold Top - SST [[id number]] Collected:  11/20/18 1048    Specimen:  Blood Updated:  11/20/18 1200     Extra Tube Hold for add-ons.     Comment: Auto resulted.       Troponin [[id number]]  (Normal) Collected:  11/20/18 1048    Specimen:  Blood Updated:  11/20/18 1126     Troponin I <0.012 ng/mL     Comprehensive Metabolic Panel [[id number]]  (Abnormal) Collected:  11/20/18 1048    Specimen:  Blood Updated:  11/20/18 1115     Glucose 132 mg/dL      BUN 20 mg/dL      Creatinine 1.20 mg/dL      Sodium 136 mmol/L      Potassium 3.8 mmol/L      Chloride 102 mmol/L      CO2 26.0 mmol/L      Calcium 8.7 mg/dL      Total Protein 6.5 g/dL      Albumin 3.70 g/dL      ALT (SGPT) 18 U/L      AST (SGOT) 19 U/L      Alkaline Phosphatase 68 U/L      Total Bilirubin 1.1 mg/dL      eGFR Non African Amer 58 mL/min/1.73      Globulin 2.8 gm/dL      A/G Ratio 1.3 g/dL      BUN/Creatinine Ratio 16.7     Anion Gap 8.0 mmol/L     Narrative:       The MDRD GFR formula is only valid for adults with stable renal function between ages 18 and 70.    Magnesium [[id number]]  (Normal) Collected:  11/20/18 1048    Specimen:  Blood Updated:  11/20/18 1115     Magnesium 2.0 mg/dL     CBC & Differential [[id number]] Collected:  11/20/18 1048    Specimen:  Blood Updated:  11/20/18 1058    Narrative:       The following orders were created  for panel order CBC & Differential.  Procedure                               Abnormality         Status                     ---------                               -----------         ------                     CBC Auto Differential[[id number]]        Abnormal            Final result                 Please view results for these tests on the individual orders.    CBC Auto Differential [[id number]]  (Abnormal) Collected:  11/20/18 1048    Specimen:  Blood Updated:  11/20/18 1058     WBC 17.16 10*3/mm3      RBC 4.06 10*6/mm3      Hemoglobin 12.7 g/dL      Hematocrit 36.6 %      MCV 90.1 fL      MCH 31.3 pg      MCHC 34.7 g/dL      RDW 12.5 %      RDW-SD 41.5 fl      MPV 8.6 fL      Platelets 227 10*3/mm3      Neutrophil % 83.2 %      Lymphocyte % 12.0 %      Monocyte % 3.8 %      Eosinophil % 0.5 %      Basophil % 0.2 %      Immature Grans % 0.3 %      Neutrophils, Absolute 14.29 10*3/mm3      Lymphocytes, Absolute 2.06 10*3/mm3      Monocytes, Absolute 0.65 10*3/mm3      Eosinophils, Absolute 0.08 10*3/mm3      Basophils, Absolute 0.03 10*3/mm3      Immature Grans, Absolute 0.05 10*3/mm3         Imaging Results (last 24 hours)     Procedure Component Value Units Date/Time    XR Femur 2 View Left [[id number]] Collected:  11/20/18 1257     Updated:  11/20/18 1326    Narrative:         EXAM:  Radiograph(s), Osseous         REGION:   Femur    SIDE:     Left     VIEWS:   2             INDICATION:    femur, S72.142A Displaced intertrochanteric  fracture of left femur, initial encounter for closed fracture  R91.8 Other nonspecific abnormal finding of lung field S72.22XA  Displaced subtrochanteric fracture of left femur, initial  encounter for closed fracture R55 Syncope and collapse     COMPARISON:    none              FINDINGS:           Minimally displaced intertrochanteric fracture.  .        Impression:       CONCLUSION:           1. Minimally displaced intertrochanteric fracture.                             Electronically  signed by:  TRE Giraldo MD  11/20/2018 1:25  PM [facility] Workstation: [id number]    CT Angiogram Aorta [[id number]] Collected:  11/20/18 1158     Updated:  11/20/18 1258    Narrative:       Procedure: CT angiogram abdominal aorta with contrast    Reason for exam: Syncope, suspect AAA.    FINDINGS: Axial computer tomography sequential imaging was  performed from the diaphragms through the symphysis pubis after  dynamic bolus contrast injection. Sagittal and coronal  reformation was performed. This exam was performed according to  our departmental dose optimization program, which includes  automated exposure control, adjustment of the mA and/or KV  according to patient size and/or use of iterative reconstruction  technique. 3-D vascular reconstruction of the abdominal aorta was  performed.    CTA findings: Proximal abdominal aortic ectasia measuring 2.5 x  2.4 cm in AP and transverse dimension. No evidence of abdominal  aortic aneurysm. No evidence of aortic dissection. Abdominal  aorta soft and calcified atherosclerotic plaque are seen none of  which appear hemodynamically significant. The celiac trunk is  normal. Proximal superior mesenteric artery partially calcified  atherosclerotic plaque which is causing 5-10% diameter stenosis.  The superior mesenteric artery is otherwise normal. The inferior  mesenteric artery is normal. The right renal artery is normal.  The left renal artery is normal. The right common iliac artery  has small foci of calcified atherosclerotic plaque none of which  appear hemodynamically significant. The right internal iliac  artery has proximal small calcified atherosclerotic plaque not  hemodynamically significant. The right external iliac artery is  normal. Proximal right common femoral artery partially calcified  atherosclerotic plaque which is causing 30% diameter stenosis.  Otherwise the right common femoral artery is unremarkable.  Proximal right deep profunda artery and right  superficial femoral  artery are normal. The left common iliac artery distal soft  atherosclerotic plaque which is causing 40-50% diameter stenosis.  The left internal iliac artery has a few proximal calcified  atherosclerotic plaque which are not hemodynamically significant.  The left external iliac artery appears normal. The left common  femoral artery has soft atherosclerotic plaque which is causing  40-50% diameter stenosis. Proximal left deep profunda artery soft  and calcified atherosclerotic plaque which is causing significant  stenosis of 80% or greater. Proximal left superficial femoral  artery appears normal.    The lung bases are unremarkable. Multiple left lobe of liver and  single posterior segment right lobe of liver small benign simple  hepatic cyst. Otherwise the liver is unremarkable. The  gallbladder is normal. The biliary system is normal. The pancreas  is normal. The spleen is normal. Bilateral adrenal glands are  normal. Right kidney mid zone small subcentimeter simple renal  cortical cyst. Right kidney lower pole circular hypodense lesion  which has Hounsfield units 28. This most likely represents benign  hyperdense cyst. However, recommend follow-up CT in 3-6  months  to document stability. Otherwise right kidney and ureter are  normal. Left kidney upper pole simple renal cortical cysts which  measures 2.9 cm in greatest diameter. Right kidney mid zone small  subcentimeter simple renal cortical cyst. Otherwise left kidney  and ureter are normal. The bladder is normal. The prostate gland  appears enlarged and is impinging upon the base of bladder.  Multiple sigmoid colon diverticula. Multiple descending colon  diverticula. Otherwise hollow viscera is unremarkable. No  lymphadenopathy in the abdomen or the pelvis. Left femur  comminuted intertrochanteric fracture. Otherwise no acute osseous  abnormality.      Impression:       1.  CTA findings as described above.  2.  Multiple liver benign  hepatic cyst.  3.  Right kidney lower pole circular hypodense lesion which has  Hounsfield units of 28. This most likely represents benign  hyperdense cyst. However, recommend follow-up CT in 3-6 months to  document stability.  4.  Additional bilateral kidney simple renal cortical cyst.  5.  Mildly enlarged prostate gland may represent changes from  benign prostatic hypertrophy versus prostate malignancy.  Recommend clinical correlation.  6.  Left femur comminuted intertrochanteric fracture.  7.  Colonic diverticulosis.    Electronically signed by:  Tulio Melendrez MD  11/20/2018 12:51 PM CST  Workstation: [id number]    CT Head Without Contrast [[id number]] Collected:  11/20/18 1113     Updated:  11/20/18 1206    Narrative:         CT Head Without Contrast    History: Syncope    Axial scans of the brain were obtained without intravenous  contrast.  Coronal and sagital reconstructions were preformed.    This exam was performed according to our departmental  dose-optimization program, which includes automated exposure  control, adjustment of the mA and/or kV according to patient size  and/or use of iterative reconstruction technique.    DLP: 908.20    Comparison: April 24, 2017    Bone windows are unremarkable.  The visualized paranasal sinuses are unremarkable.    No acute process.  Cerebral atrophy.  Minimal small vessel disease.  No hemorrhage.  No mass.  No abnormal areas of increased attenuation.  No midline shift.  No abnormal extra-axial fluid collections.      Impression:       CONCLUSION:  No acute process.  Cerebral atrophy.  Minimal small vessel disease.    [id number]    Electronically signed by:  Jose Handley MD  11/20/2018 11:54 AM  CST Workstation: [id number]    XR Hip With or Without Pelvis 2 - 3 View Left [[id number]] Collected:  11/20/18 1052     Updated:  11/20/18 1206    Narrative:       Procedure: XR HIP 2 OR MORE VIEWS UNILATERAL WITH PELVIS.    History: fall, left hip pain, inability to bear  weight.    Comparison: None    Findings:  Frontal view of the pelvis and two views of the left hip were  obtained, demonstrating a displaced, possibly comminuted,  medially angulated intertrochanteric fracture of the left  proximal femur. No other fractures are seen. There are  degenerative changes of the lower lumbar spine. The bones appear  demineralized.      Impression:       Impression: Displaced, possibly comminuted, medially angulated  intertrochanteric fracture of the left proximal femur.     Electronically signed by:  Tone Olivas MD  11/20/2018 11:47 AM  CST Workstation: [id number]    XR Chest 1 View [[id number]] Collected:  11/20/18 1102     Updated:  11/20/18 1145    Narrative:         EXAM:         Radiograph(s), Chest   VIEWS:   Frontal  ; 1       DATE/TIME:  11/20/2018 11:43 AM CST                INDICATION:   pre op, hip fracture    COMPARISON:  CXR: none             FINDINGS:             - lines/tubes:    none     - cardiac:         size within normal limits         - mediastinum: Fullness in the right paratracheal and right  suprahilar regions, suspicious for the presence of a mass lesion.          - lungs:         no focal air space process, pulmonary  interstitial edema, nodule(s)/mass             - pleura:         no evidence of  fluid                  - osseous:         unremarkable for age                  - misc.:         Impression:       CONCLUSION:        1. Mass lesion suspected present in the right suprahilar region.  Correlation with contrast-enhanced CT recommended.                                                              Electronically signed by:  TRE Giraldo MD  11/20/2018 11:44  AM CST Workstation: [id number]          Xray left hip transtrochanteric femur fracture, displaced     Diagnosis Plan   1. Closed displaced intertrochanteric fracture of left femur, initial encounter (CMS/[facility])     2. Mass of right lung     3. Closed displaced subtrochanteric fracture of left femur,  initial encounter (CMS/[facility])  Case Request    Case Request   4. Syncope, unspecified syncope type       Recommend surgery for fixation of /L femur fracture, no good option for non operative treatment.  Surgery will require mechanical fixation, described to him.  Risks of surgery include deep or superficial infection hematoma, wound healing complications malpositioning of instrumentation, loosening, non union, malunion, persistent pain, and additional surgery/ revision surgery and blood transfusion.  Nerve or vascular injury is possible.  Medical risks of anesthesia include death, heart attack heart arrhythmia, heart failure stroke gastric ulceration perforation peritonitis deep vein thrombosis pulmonary embolus pneumonia pulmonary failure necessitating prolonged ventilation renal failure sepsis and multiorgan system failure.  All risks have been reviewed extensively.  There are no guarantees.  Surgery is posted for scheduling, canceled by anesthesia service due to risk for stroke.      Electronically signed by Wu Hare MD at 11/20/2018  9:34 PM     Kyrie Mcdonald MD at 11/20/2018  2:28 PM          History and Physical  Kyrie Mcdonald MD  Hospitalist    Date of admission: 11/20/2018    Patient Care Team:  Debbi Jackson MD as PCP - General    Chief complaint   Chief Complaint   Patient presents with   • L sided hip pain after a    • Fall     Subjective     Patient is a 82 y.o. male admitted for a fall sustained at home. He claims to have felt dizzy, maybe even have a near syncope severe enough to fall and injure his L hip.    History  Past Medical History:   Diagnosis Date   • Abdominal aortic aneurysm (CMS/[facility])    • Carotid stenosis, bilateral    • Cataract    • Emphysema of lung (CMS/[facility])    • Herpes zoster    • Hyperlipidemia    • Hypertension      Past Surgical History:   Procedure Laterality Date   • CARDIAC CATHETERIZATION     • COLONOSCOPY W/ POLYPECTOMY     • HAND SURGERY       Family  History   Problem Relation Age of Onset   • Breast cancer Mother    • Hypertension Father      Social History     Tobacco Use   • Smoking status: Current Every Day Smoker     Packs/day: 1.00     Years: 60.00     Pack years: 60.00     Types: Cigarettes   • Smokeless tobacco: Former User   Substance Use Topics   • Alcohol use: No   • Drug use: No     Medications Prior to Admission   Medication Sig Dispense Refill Last Dose   • aspirin 81 MG chewable tablet Chew 81 mg Daily.   Taking   • latanoprost (XALATAN) 0.005 % ophthalmic solution Administer 1 drop into the left eye Every Night.   Taking   • lisinopril (PRINIVIL,ZESTRIL) 20 MG tablet Take 1 tablet by mouth Daily. 90 tablet 3 Taking   • Multiple Vitamins-Minerals (VISION-HECTOR PRESERVE PO) Take 1 capsule by mouth 2 (Two) Times a Day. PreserVision Lutein 226 mg-200 unit-5 mg-0.8 mg capsule    Taking   • simvastatin (ZOCOR) 20 MG tablet Take 1 tablet by mouth Every Night. Bedtime 90 tablet 3 Taking   • varenicline (CHANTIX CONTINUING MONTH HELIO) 1 MG tablet Take 1 tablet by mouth 2 (Two) Times a Day. 60 tablet 3    • varenicline (CHANTIX STARTING MONTH PAK) 0.5 MG X 11 & 1 MG X 42 tablet Take 0.5 mg one daily on days 1-3 and and 0.5 mg twice daily on days 4-7.Then 1 mg twice daily for a total of 12 weeks. 53 tablet 0      Allergies:  Patient has no known allergies.    Review of Systems  Review of Systems   Constitutional: Positive for fatigue.   Respiratory: Negative for cough, shortness of breath and wheezing.    Cardiovascular: Negative for chest pain and leg swelling.   Gastrointestinal: Negative for abdominal distention, abdominal pain and anal bleeding.   Genitourinary: Negative for dysuria, frequency, penile pain and urgency.   Musculoskeletal: Positive for arthralgias and back pain. Negative for gait problem and joint swelling.   Skin: Positive for pallor.   Neurological: Positive for dizziness, weakness and light-headedness. Negative for seizures, syncope and  numbness.   Psychiatric/Behavioral: Negative for agitation, behavioral problems and confusion.   All other systems reviewed and are negative.      Objective     Vital Signs  Temp:  [96.2 °F (35.7 °C)-96.9 °F (36.1 °C)] 96.9 °F (36.1 °C)  Heart Rate:  [66-92] 92  Resp:  [18] 18  BP: (116-171)/(58-82) 124/82    Physical Exam:  Physical Exam   Constitutional: He is oriented to person, place, and time. He appears well-developed and well-nourished. No distress.   HENT:   Head: Normocephalic and atraumatic.   Eyes: EOM are normal. Pupils are equal, round, and reactive to light. No scleral icterus.   Neck: Normal range of motion. Neck supple.   Cardiovascular: Normal rate and regular rhythm.   Pulmonary/Chest: Effort normal and breath sounds normal. No stridor. No respiratory distress. He has no wheezes.   Abdominal: Soft. Bowel sounds are normal. He exhibits no distension. There is no tenderness.   Musculoskeletal: He exhibits tenderness (L sided hip pain). He exhibits no edema.   Neurological: He is alert and oriented to person, place, and time.   Skin: Skin is warm and dry. There is pallor.   Psychiatric: He has a normal mood and affect. His behavior is normal.   Vitals reviewed.      Results Review:   Lab Results (last 24 hours)     Procedure Component Value Units Date/Time    Urinalysis, Microscopic Only - Urine, Catheter [[id number]]  (Abnormal) Collected:  11/20/18 1320    Specimen:  Urine, Catheter Updated:  11/20/18 1407     RBC, UA 3-5 /HPF      WBC, UA 21-30 /HPF      Bacteria, UA None Seen /HPF      Squamous Epithelial Cells, UA 6-12 /HPF      Hyaline Casts, UA 21-30 /LPF      Methodology Manual Light Microscopy    Urine Culture - Urine, Urine, Catheter [[id number]] Collected:  11/20/18 1320    Specimen:  Urine, Catheter Updated:  11/20/18 1407    Urinalysis With Culture If Indicated - Urine, Catheter [[id number]]  (Abnormal) Collected:  11/20/18 1320    Specimen:  Urine, Catheter Updated:  11/20/18 1342      Color, UA Yellow     Appearance, UA Cloudy     pH, UA 5.5     Specific Gravity, UA 1.020     Glucose, UA Negative     Ketones, UA Negative     Bilirubin, UA Negative     Blood, UA Trace     Protein,  mg/dL (2+)     Leuk Esterase, UA Negative     Nitrite, UA Negative     Urobilinogen, UA 0.2 E.U./dL    Protime-INR [[id number]]  (Normal) Collected:  11/20/18 1048    Specimen:  Blood Updated:  11/20/18 1304     Protime 14.0 Seconds      INR 1.10    Narrative:       Therapeutic range for most indications is 2.0-3.0 INR,  or 2.5-3.5 for mechanical heart valves.    aPTT [[id number]]  (Normal) Collected:  11/20/18 1048    Specimen:  Blood Updated:  11/20/18 1304     PTT 33.3 seconds     Narrative:       The recommended Heparin therapeutic range is 68-97 seconds.    Novelty Draw [[id number]] Collected:  11/20/18 1048    Specimen:  Blood Updated:  11/20/18 1200    Narrative:       The following orders were created for panel order Novelty Draw.  Procedure                               Abnormality         Status                     ---------                               -----------         ------                     Light Blue Top[[id number]]                                   Final result               Green Top (Gel)[[id number]]                                  Final result               Lavender Top[[id number]]                                     Final result               Gold Top - SST[[id number]]                                   Final result                 Please view results for these tests on the individual orders.    Light Blue Top [[id number]] Collected:  11/20/18 1048    Specimen:  Blood Updated:  11/20/18 1200     Extra Tube hold for add-on     Comment: Auto resulted       Green Top (Gel) [[id number]] Collected:  11/20/18 1048    Specimen:  Blood Updated:  11/20/18 1200     Extra Tube Hold for add-ons.     Comment: Auto resulted.       Lavender Top [[id number]] Collected:  11/20/18 1048    Specimen:  Blood Updated:   11/20/18 1200     Extra Tube hold for add-on     Comment: Auto resulted       Gold Top - SST [[id number]] Collected:  11/20/18 1048    Specimen:  Blood Updated:  11/20/18 1200     Extra Tube Hold for add-ons.     Comment: Auto resulted.       Troponin [[id number]]  (Normal) Collected:  11/20/18 1048    Specimen:  Blood Updated:  11/20/18 1126     Troponin I <0.012 ng/mL     Comprehensive Metabolic Panel [[id number]]  (Abnormal) Collected:  11/20/18 1048    Specimen:  Blood Updated:  11/20/18 1115     Glucose 132 mg/dL      BUN 20 mg/dL      Creatinine 1.20 mg/dL      Sodium 136 mmol/L      Potassium 3.8 mmol/L      Chloride 102 mmol/L      CO2 26.0 mmol/L      Calcium 8.7 mg/dL      Total Protein 6.5 g/dL      Albumin 3.70 g/dL      ALT (SGPT) 18 U/L      AST (SGOT) 19 U/L      Alkaline Phosphatase 68 U/L      Total Bilirubin 1.1 mg/dL      eGFR Non African Amer 58 mL/min/1.73      Globulin 2.8 gm/dL      A/G Ratio 1.3 g/dL      BUN/Creatinine Ratio 16.7     Anion Gap 8.0 mmol/L     Narrative:       The MDRD GFR formula is only valid for adults with stable renal function between ages 18 and 70.    Magnesium [[id number]]  (Normal) Collected:  11/20/18 1048    Specimen:  Blood Updated:  11/20/18 1115     Magnesium 2.0 mg/dL     CBC & Differential [[id number]] Collected:  11/20/18 1048    Specimen:  Blood Updated:  11/20/18 1058    Narrative:       The following orders were created for panel order CBC & Differential.  Procedure                               Abnormality         Status                     ---------                               -----------         ------                     CBC Auto Differential[[id number]]        Abnormal            Final result                 Please view results for these tests on the individual orders.    CBC Auto Differential [[id number]]  (Abnormal) Collected:  11/20/18 1048    Specimen:  Blood Updated:  11/20/18 1058     WBC 17.16 10*3/mm3      RBC 4.06 10*6/mm3      Hemoglobin 12.7 g/dL       Hematocrit 36.6 %      MCV 90.1 fL      MCH 31.3 pg      MCHC 34.7 g/dL      RDW 12.5 %      RDW-SD 41.5 fl      MPV 8.6 fL      Platelets 227 10*3/mm3      Neutrophil % 83.2 %      Lymphocyte % 12.0 %      Monocyte % 3.8 %      Eosinophil % 0.5 %      Basophil % 0.2 %      Immature Grans % 0.3 %      Neutrophils, Absolute 14.29 10*3/mm3      Lymphocytes, Absolute 2.06 10*3/mm3      Monocytes, Absolute 0.65 10*3/mm3      Eosinophils, Absolute 0.08 10*3/mm3      Basophils, Absolute 0.03 10*3/mm3      Immature Grans, Absolute 0.05 10*3/mm3           Imaging Results (last 24 hours)     Procedure Component Value Units Date/Time    XR Femur 2 View Left [[id number]] Collected:  11/20/18 1257     Updated:  11/20/18 1326    Narrative:         EXAM:  Radiograph(s), Osseous         REGION:   Femur    SIDE:     Left     VIEWS:   2             INDICATION:    femur, S72.142A Displaced intertrochanteric  fracture of left femur, initial encounter for closed fracture  R91.8 Other nonspecific abnormal finding of lung field S72.22XA  Displaced subtrochanteric fracture of left femur, initial  encounter for closed fracture R55 Syncope and collapse     COMPARISON:    none              FINDINGS:           Minimally displaced intertrochanteric fracture.  .        Impression:       CONCLUSION:           1. Minimally displaced intertrochanteric fracture.                             Electronically signed by:  TRE Giraldo MD  11/20/2018 1:25  PM CST Workstation: [id number]    CT Angiogram Aorta [[id number]] Collected:  11/20/18 1158     Updated:  11/20/18 1253    Narrative:       Procedure: CT angiogram abdominal aorta with contrast    Reason for exam: Syncope, suspect AAA.    FINDINGS: Axial computer tomography sequential imaging was  performed from the diaphragms through the symphysis pubis after  dynamic bolus contrast injection. Sagittal and coronal  reformation was performed. This exam was performed according to  our departmental  dose optimization program, which includes  automated exposure control, adjustment of the mA and/or KV  according to patient size and/or use of iterative reconstruction  technique. 3-D vascular reconstruction of the abdominal aorta was  performed.    CTA findings: Proximal abdominal aortic ectasia measuring 2.5 x  2.4 cm in AP and transverse dimension. No evidence of abdominal  aortic aneurysm. No evidence of aortic dissection. Abdominal  aorta soft and calcified atherosclerotic plaque are seen none of  which appear hemodynamically significant. The celiac trunk is  normal. Proximal superior mesenteric artery partially calcified  atherosclerotic plaque which is causing 5-10% diameter stenosis.  The superior mesenteric artery is otherwise normal. The inferior  mesenteric artery is normal. The right renal artery is normal.  The left renal artery is normal. The right common iliac artery  has small foci of calcified atherosclerotic plaque none of which  appear hemodynamically significant. The right internal iliac  artery has proximal small calcified atherosclerotic plaque not  hemodynamically significant. The right external iliac artery is  normal. Proximal right common femoral artery partially calcified  atherosclerotic plaque which is causing 30% diameter stenosis.  Otherwise the right common femoral artery is unremarkable.  Proximal right deep profunda artery and right superficial femoral  artery are normal. The left common iliac artery distal soft  atherosclerotic plaque which is causing 40-50% diameter stenosis.  The left internal iliac artery has a few proximal calcified  atherosclerotic plaque which are not hemodynamically significant.  The left external iliac artery appears normal. The left common  femoral artery has soft atherosclerotic plaque which is causing  40-50% diameter stenosis. Proximal left deep profunda artery soft  and calcified atherosclerotic plaque which is causing significant  stenosis of 80% or  greater. Proximal left superficial femoral  artery appears normal.    The lung bases are unremarkable. Multiple left lobe of liver and  single posterior segment right lobe of liver small benign simple  hepatic cyst. Otherwise the liver is unremarkable. The  gallbladder is normal. The biliary system is normal. The pancreas  is normal. The spleen is normal. Bilateral adrenal glands are  normal. Right kidney mid zone small subcentimeter simple renal  cortical cyst. Right kidney lower pole circular hypodense lesion  which has Hounsfield units 28. This most likely represents benign  hyperdense cyst. However, recommend follow-up CT in 3-6  months  to document stability. Otherwise right kidney and ureter are  normal. Left kidney upper pole simple renal cortical cysts which  measures 2.9 cm in greatest diameter. Right kidney mid zone small  subcentimeter simple renal cortical cyst. Otherwise left kidney  and ureter are normal. The bladder is normal. The prostate gland  appears enlarged and is impinging upon the base of bladder.  Multiple sigmoid colon diverticula. Multiple descending colon  diverticula. Otherwise hollow viscera is unremarkable. No  lymphadenopathy in the abdomen or the pelvis. Left femur  comminuted intertrochanteric fracture. Otherwise no acute osseous  abnormality.      Impression:       1.  CTA findings as described above.  2.  Multiple liver benign hepatic cyst.  3.  Right kidney lower pole circular hypodense lesion which has  Hounsfield units of 28. This most likely represents benign  hyperdense cyst. However, recommend follow-up CT in 3-6 months to  document stability.  4.  Additional bilateral kidney simple renal cortical cyst.  5.  Mildly enlarged prostate gland may represent changes from  benign prostatic hypertrophy versus prostate malignancy.  Recommend clinical correlation.  6.  Left femur comminuted intertrochanteric fracture.  7.  Colonic diverticulosis.    Electronically signed by:  Tulio  Aneesh FLYNN  11/20/2018 12:51 PM CST  Workstation: [id number]    CT Head Without Contrast [[id number]] Collected:  11/20/18 1113     Updated:  11/20/18 1206    Narrative:         CT Head Without Contrast    History: Syncope    Axial scans of the brain were obtained without intravenous  contrast.  Coronal and sagital reconstructions were preformed.    This exam was performed according to our departmental  dose-optimization program, which includes automated exposure  control, adjustment of the mA and/or kV according to patient size  and/or use of iterative reconstruction technique.    DLP: 908.20    Comparison: April 24, 2017    Bone windows are unremarkable.  The visualized paranasal sinuses are unremarkable.    No acute process.  Cerebral atrophy.  Minimal small vessel disease.  No hemorrhage.  No mass.  No abnormal areas of increased attenuation.  No midline shift.  No abnormal extra-axial fluid collections.      Impression:       CONCLUSION:  No acute process.  Cerebral atrophy.  Minimal small vessel disease.    [id number]    Electronically signed by:  Jose Handley MD  11/20/2018 11:54 AM  CST Workstation: [id number]    XR Hip With or Without Pelvis 2 - 3 View Left [[id number]] Collected:  11/20/18 1052     Updated:  11/20/18 1206    Narrative:       Procedure: XR HIP 2 OR MORE VIEWS UNILATERAL WITH PELVIS.    History: fall, left hip pain, inability to bear weight.    Comparison: None    Findings:  Frontal view of the pelvis and two views of the left hip were  obtained, demonstrating a displaced, possibly comminuted,  medially angulated intertrochanteric fracture of the left  proximal femur. No other fractures are seen. There are  degenerative changes of the lower lumbar spine. The bones appear  demineralized.      Impression:       Impression: Displaced, possibly comminuted, medially angulated  intertrochanteric fracture of the left proximal femur.     Electronically signed by:  Tone Olivas MD  11/20/2018 11:47 AM  CST  Workstation: [id number]    XR Chest 1 View [[id number]] Collected:  11/20/18 1102     Updated:  11/20/18 1145    Narrative:         EXAM:         Radiograph(s), Chest   VIEWS:   Frontal  ; 1       DATE/TIME:  11/20/2018 11:43 AM CST                INDICATION:   pre op, hip fracture    COMPARISON:  CXR: none             FINDINGS:             - lines/tubes:    none     - cardiac:         size within normal limits         - mediastinum: Fullness in the right paratracheal and right  suprahilar regions, suspicious for the presence of a mass lesion.          - lungs:         no focal air space process, pulmonary  interstitial edema, nodule(s)/mass             - pleura:         no evidence of  fluid                  - osseous:         unremarkable for age                  - misc.:         Impression:       CONCLUSION:        1. Mass lesion suspected present in the right suprahilar region.  Correlation with contrast-enhanced CT recommended.                                                              Electronically signed by:  TRE Giraldo MD  11/20/2018 11:44  AM CST Workstation: [id number]          Assessment/Plan       Closed displaced intertrochanteric fracture of left femur (CMS/HCC)    Hypertension    Emphysema of lung (CMS/HCC)    Atherosclerosis    Symptomatic pain relief, supportive care, Oxygen, nebulized treatments, consult Orthopedic Surgery.    Kyrie Mcdonald MD  11/20/18  3:24 PM      Electronically signed by Kyrie Mcdonald MD at 11/20/2018  3:26 PM          Operative/Procedure Notes (all)      Wu Hare MD at 11/21/2018  1:59 PM  Version 1 of 1         FEMUR INTRAMEDULLARY NAILING/RODDING  Progress Note    Maciej Fukn  11/20/2018 - 11/21/2018    Pre-op Diagnosis:   Closed displaced intertrochanteric fracture of left femur, initial encounter (CMS/[facility]) [S72.142A]       Post-Op Diagnosis Codes:     * Closed displaced intertrochanteric fracture of left femur, initial encounter (CMS/[facility])  [S72.142A]    Procedure/CPT® Codes:  RI OPEN FIX INTER/SUBTROCH FX,IMPLNT [16224]  CHG RADEX HIP UNILATERAL WITH PELVIS 2-3 VIEWS [67545]    Procedure(s):  FEMUR INTRAMEDULLARY NAILING/RODDING, open reduction internal fixation left femur fracture    Surgeon(s):  Wu Hare MD    Anesthesia: General    Staff:   Circulator: Martha Juan RN; Donna Husain RN  Radiology Technologist: Jo-Ann Christie  Scrub Person: Pita Matthews; Mahnaz Smith    Estimated Blood Loss: 50    Urine Voided: * No values recorded between 11/21/2018  2:16 PM and 11/21/2018  3:53 PM *    Specimens:                none      Drains:   Urethral Catheter Silicone (Active)   Daily Indications Required activity restriction from trauma, surgery, (e.g. unstable spine, fracture, hemodynamics) 11/21/2018  8:29 AM   Site Assessment Clean;Skin intact 11/21/2018 12:47 PM   Collection Container Standard drainage bag 11/21/2018 12:47 PM   Securement Method Securing device 11/21/2018 12:47 PM   Catheter care complete Yes 11/21/2018  8:46 AM   Output (mL) 200 mL 11/21/2018 12:13 PM       Findings: transtrochanteric femur fracture    Complications: none      Wu Hare MD     Date: 11/21/2018  Time: 4:03 PM        Electronically signed by Wu Hare MD at 11/21/2018  4:05 PM     Wu Hare MD at 11/21/2018  1:59 PM  Version 1 of 1       Diagnosis; transtrochanteric femur fracture left femur  Diagnosis; transtrochanteric femur fracture left femur  Surgery intramedullary nail open reduction internal fixation left transtrochanteric femur fracture, left femur, xray left hip  General anesthesia  Surgeon BBurkett  Blood loss 50  The patient is brought to the operating room, the patient is anesthetized, airway is secured. The patient is positioned, legs placed in traction. The left hip is washed, sterile drapes.  Sterile technique is performed throughout the course of the surgery.  The  consent and IV antibiotics are reviewed.  An incision is placed on the lateral thigh, I dissected to the greater trochanter.  The femur is reduced openly.  I advanced a guidewire into the greater trochanter, I reamed the greater trochanter.  A 11mm synthes nail is positioned.  The guidearm barrel is placed. A guidewire is positioned within the femoral neck.  I advanced the guidewire to the femoral head, the position was confirmed with xray.  I measured a 105mm bolt, drilled the femoral neck guidewire.  I impacted the bolt into the subchondral bone.  The position is confirmed with xray.  The bolt is positioned within the femoral head/ neck.  The bolt is locked with internal locking mechanism.  I placed interlocking screw.  The guidearm is dissasembled.  The xray shows reduction and placement of fracture appliance in excellent position.  The wound is irrigated.  The wound is sutured and staples placed over the skin.  Sterile dressings are placed.  No complications.    Electronically signed by Wu Hare MD at 11/21/2018  4:26 PM

## 2025-07-25 ENCOUNTER — EMERGENCY (EMERGENCY)
Facility: HOSPITAL | Age: 50
LOS: 1 days | Discharge: HOME | End: 2025-07-27
Attending: EMERGENCY MEDICINE
Payer: COMMERCIAL

## 2025-07-25 VITALS
DIASTOLIC BLOOD PRESSURE: 78 MMHG | HEIGHT: 70.87 IN | TEMPERATURE: 98 F | RESPIRATION RATE: 18 BRPM | OXYGEN SATURATION: 96 % | WEIGHT: 203.27 LBS | SYSTOLIC BLOOD PRESSURE: 138 MMHG | HEART RATE: 74 BPM

## 2025-07-25 DIAGNOSIS — R03.0 ELEVATED BLOOD-PRESSURE READING, WITHOUT DIAGNOSIS OF HYPERTENSION: ICD-10-CM

## 2025-07-25 DIAGNOSIS — R51.9 HEADACHE, UNSPECIFIED: ICD-10-CM

## 2025-07-25 DIAGNOSIS — Z90.49 ACQUIRED ABSENCE OF OTHER SPECIFIED PARTS OF DIGESTIVE TRACT: ICD-10-CM

## 2025-07-25 DIAGNOSIS — H53.8 OTHER VISUAL DISTURBANCES: ICD-10-CM

## 2025-07-25 DIAGNOSIS — F17.210 NICOTINE DEPENDENCE, CIGARETTES, UNCOMPLICATED: ICD-10-CM

## 2025-07-25 DIAGNOSIS — K64.4 RESIDUAL HEMORRHOIDAL SKIN TAGS: ICD-10-CM

## 2025-07-25 DIAGNOSIS — I25.10 ATHEROSCLEROTIC HEART DISEASE OF NATIVE CORONARY ARTERY WITHOUT ANGINA PECTORIS: ICD-10-CM

## 2025-07-25 DIAGNOSIS — M43.6 TORTICOLLIS: ICD-10-CM

## 2025-07-25 DIAGNOSIS — R11.0 NAUSEA: ICD-10-CM

## 2025-07-25 DIAGNOSIS — R42 DIZZINESS AND GIDDINESS: ICD-10-CM

## 2025-07-25 DIAGNOSIS — Z90.49 ACQUIRED ABSENCE OF OTHER SPECIFIED PARTS OF DIGESTIVE TRACT: Chronic | ICD-10-CM

## 2025-07-25 DIAGNOSIS — R10.13 EPIGASTRIC PAIN: ICD-10-CM

## 2025-07-25 DIAGNOSIS — R07.89 OTHER CHEST PAIN: ICD-10-CM

## 2025-07-25 LAB
ALBUMIN SERPL ELPH-MCNC: 4.2 G/DL — SIGNIFICANT CHANGE UP (ref 3.5–5.2)
ALP SERPL-CCNC: 53 U/L — SIGNIFICANT CHANGE UP (ref 30–115)
ALT FLD-CCNC: 54 U/L — HIGH (ref 0–41)
ANION GAP SERPL CALC-SCNC: 15 MMOL/L — HIGH (ref 7–14)
AST SERPL-CCNC: 37 U/L — SIGNIFICANT CHANGE UP (ref 0–41)
BASOPHILS # BLD AUTO: 0.04 K/UL — SIGNIFICANT CHANGE UP (ref 0–0.2)
BASOPHILS NFR BLD AUTO: 0.6 % — SIGNIFICANT CHANGE UP (ref 0–1)
BILIRUB SERPL-MCNC: 0.7 MG/DL — SIGNIFICANT CHANGE UP (ref 0.2–1.2)
BUN SERPL-MCNC: 13 MG/DL — SIGNIFICANT CHANGE UP (ref 10–20)
CALCIUM SERPL-MCNC: 9 MG/DL — SIGNIFICANT CHANGE UP (ref 8.4–10.5)
CHLORIDE SERPL-SCNC: 102 MMOL/L — SIGNIFICANT CHANGE UP (ref 98–110)
CO2 SERPL-SCNC: 21 MMOL/L — SIGNIFICANT CHANGE UP (ref 17–32)
CREAT SERPL-MCNC: 0.9 MG/DL — SIGNIFICANT CHANGE UP (ref 0.7–1.5)
EGFR: 105 ML/MIN/1.73M2 — SIGNIFICANT CHANGE UP
EGFR: 105 ML/MIN/1.73M2 — SIGNIFICANT CHANGE UP
EOSINOPHIL # BLD AUTO: 0.16 K/UL — SIGNIFICANT CHANGE UP (ref 0–0.7)
EOSINOPHIL NFR BLD AUTO: 2.6 % — SIGNIFICANT CHANGE UP (ref 0–8)
GLUCOSE SERPL-MCNC: 88 MG/DL — SIGNIFICANT CHANGE UP (ref 70–99)
HCT VFR BLD CALC: 42.3 % — SIGNIFICANT CHANGE UP (ref 42–52)
HGB BLD-MCNC: 15 G/DL — SIGNIFICANT CHANGE UP (ref 14–18)
IMM GRANULOCYTES NFR BLD AUTO: 0.2 % — SIGNIFICANT CHANGE UP (ref 0.1–0.3)
LYMPHOCYTES # BLD AUTO: 2.42 K/UL — SIGNIFICANT CHANGE UP (ref 1.2–3.4)
LYMPHOCYTES # BLD AUTO: 38.8 % — SIGNIFICANT CHANGE UP (ref 20.5–51.1)
MCHC RBC-ENTMCNC: 33.2 PG — HIGH (ref 27–31)
MCHC RBC-ENTMCNC: 35.5 G/DL — SIGNIFICANT CHANGE UP (ref 32–37)
MCV RBC AUTO: 93.6 FL — SIGNIFICANT CHANGE UP (ref 80–94)
MONOCYTES # BLD AUTO: 0.55 K/UL — SIGNIFICANT CHANGE UP (ref 0.1–0.6)
MONOCYTES NFR BLD AUTO: 8.8 % — SIGNIFICANT CHANGE UP (ref 1.7–9.3)
NEUTROPHILS # BLD AUTO: 3.05 K/UL — SIGNIFICANT CHANGE UP (ref 1.4–6.5)
NEUTROPHILS NFR BLD AUTO: 49 % — SIGNIFICANT CHANGE UP (ref 42.2–75.2)
NRBC BLD AUTO-RTO: 0 /100 WBCS — SIGNIFICANT CHANGE UP (ref 0–0)
PLATELET # BLD AUTO: 249 K/UL — SIGNIFICANT CHANGE UP (ref 130–400)
PMV BLD: 9.4 FL — SIGNIFICANT CHANGE UP (ref 7.4–10.4)
POTASSIUM SERPL-MCNC: 4.3 MMOL/L — SIGNIFICANT CHANGE UP (ref 3.5–5)
POTASSIUM SERPL-SCNC: 4.3 MMOL/L — SIGNIFICANT CHANGE UP (ref 3.5–5)
PROT SERPL-MCNC: 6.5 G/DL — SIGNIFICANT CHANGE UP (ref 6–8)
RBC # BLD: 4.52 M/UL — LOW (ref 4.7–6.1)
RBC # FLD: 11.7 % — SIGNIFICANT CHANGE UP (ref 11.5–14.5)
SODIUM SERPL-SCNC: 138 MMOL/L — SIGNIFICANT CHANGE UP (ref 135–146)
TROPONIN T, HIGH SENSITIVITY RESULT: 10 NG/L — SIGNIFICANT CHANGE UP (ref 6–21)
TROPONIN T, HIGH SENSITIVITY RESULT: 12 NG/L — SIGNIFICANT CHANGE UP (ref 6–21)
WBC # BLD: 6.23 K/UL — SIGNIFICANT CHANGE UP (ref 4.8–10.8)
WBC # FLD AUTO: 6.23 K/UL — SIGNIFICANT CHANGE UP (ref 4.8–10.8)

## 2025-07-25 PROCEDURE — 70450 CT HEAD/BRAIN W/O DYE: CPT

## 2025-07-25 PROCEDURE — 36415 COLL VENOUS BLD VENIPUNCTURE: CPT

## 2025-07-25 PROCEDURE — 93010 ELECTROCARDIOGRAM REPORT: CPT | Mod: 77

## 2025-07-25 PROCEDURE — 80053 COMPREHEN METABOLIC PANEL: CPT

## 2025-07-25 PROCEDURE — 71045 X-RAY EXAM CHEST 1 VIEW: CPT

## 2025-07-25 PROCEDURE — 93010 ELECTROCARDIOGRAM REPORT: CPT

## 2025-07-25 PROCEDURE — 99223 1ST HOSP IP/OBS HIGH 75: CPT

## 2025-07-25 PROCEDURE — G0378: CPT

## 2025-07-25 PROCEDURE — 93005 ELECTROCARDIOGRAM TRACING: CPT

## 2025-07-25 PROCEDURE — 84484 ASSAY OF TROPONIN QUANT: CPT

## 2025-07-25 PROCEDURE — 85025 COMPLETE CBC W/AUTO DIFF WBC: CPT

## 2025-07-25 PROCEDURE — 70450 CT HEAD/BRAIN W/O DYE: CPT | Mod: 26

## 2025-07-25 PROCEDURE — 71045 X-RAY EXAM CHEST 1 VIEW: CPT | Mod: 26

## 2025-07-25 PROCEDURE — 75574 CT ANGIO HRT W/3D IMAGE: CPT

## 2025-07-25 PROCEDURE — 99285 EMERGENCY DEPT VISIT HI MDM: CPT | Mod: 25

## 2025-07-25 RX ORDER — ASPIRIN 325 MG
324 TABLET ORAL ONCE
Refills: 0 | Status: COMPLETED | OUTPATIENT
Start: 2025-07-25 | End: 2025-07-25

## 2025-07-25 RX ADMIN — Medication 324 MILLIGRAM(S): at 20:38

## 2025-07-26 VITALS
RESPIRATION RATE: 18 BRPM | SYSTOLIC BLOOD PRESSURE: 134 MMHG | DIASTOLIC BLOOD PRESSURE: 83 MMHG | OXYGEN SATURATION: 100 % | HEART RATE: 77 BPM | TEMPERATURE: 98 F

## 2025-07-26 PROCEDURE — 75574 CT ANGIO HRT W/3D IMAGE: CPT | Mod: 26

## 2025-07-26 PROCEDURE — 99239 HOSP IP/OBS DSCHRG MGMT >30: CPT

## 2025-07-26 RX ORDER — METOPROLOL SUCCINATE 50 MG/1
100 TABLET, EXTENDED RELEASE ORAL ONCE
Refills: 0 | Status: COMPLETED | OUTPATIENT
Start: 2025-07-26 | End: 2025-07-26

## 2025-07-26 RX ORDER — ASPIRIN 325 MG
1 TABLET ORAL
Qty: 30 | Refills: 0
Start: 2025-07-26 | End: 2025-08-24

## 2025-07-26 RX ORDER — ATORVASTATIN CALCIUM 80 MG/1
1 TABLET, FILM COATED ORAL
Qty: 30 | Refills: 0
Start: 2025-07-26 | End: 2025-08-24

## 2025-07-26 RX ADMIN — METOPROLOL SUCCINATE 100 MILLIGRAM(S): 50 TABLET, EXTENDED RELEASE ORAL at 06:09

## 2025-07-28 PROBLEM — K64.8 OTHER HEMORRHOIDS: Chronic | Status: ACTIVE | Noted: 2025-07-25

## 2025-08-21 PROBLEM — Z00.00 ENCOUNTER FOR PREVENTIVE HEALTH EXAMINATION: Status: ACTIVE | Noted: 2025-08-21

## 2025-09-02 ENCOUNTER — APPOINTMENT (OUTPATIENT)
Dept: CARDIOLOGY | Facility: CLINIC | Age: 50
End: 2025-09-02